# Patient Record
Sex: FEMALE | Race: WHITE | NOT HISPANIC OR LATINO | Employment: FULL TIME | URBAN - METROPOLITAN AREA
[De-identification: names, ages, dates, MRNs, and addresses within clinical notes are randomized per-mention and may not be internally consistent; named-entity substitution may affect disease eponyms.]

---

## 2021-02-04 ENCOUNTER — TRANSFERRED RECORDS (OUTPATIENT)
Dept: HEALTH INFORMATION MANAGEMENT | Facility: CLINIC | Age: 26
End: 2021-02-04

## 2021-02-19 ENCOUNTER — TRANSFERRED RECORDS (OUTPATIENT)
Dept: HEALTH INFORMATION MANAGEMENT | Facility: CLINIC | Age: 26
End: 2021-02-19

## 2021-02-24 ENCOUNTER — TRANSFERRED RECORDS (OUTPATIENT)
Dept: HEALTH INFORMATION MANAGEMENT | Facility: CLINIC | Age: 26
End: 2021-02-24

## 2021-05-11 ENCOUNTER — MEDICAL CORRESPONDENCE (OUTPATIENT)
Dept: HEALTH INFORMATION MANAGEMENT | Facility: CLINIC | Age: 26
End: 2021-05-11

## 2021-05-28 ENCOUNTER — TRANSFERRED RECORDS (OUTPATIENT)
Dept: HEALTH INFORMATION MANAGEMENT | Facility: CLINIC | Age: 26
End: 2021-05-28

## 2021-06-16 ENCOUNTER — TRANSFERRED RECORDS (OUTPATIENT)
Dept: HEALTH INFORMATION MANAGEMENT | Facility: CLINIC | Age: 26
End: 2021-06-16

## 2021-09-27 ENCOUNTER — TRANSFERRED RECORDS (OUTPATIENT)
Dept: HEALTH INFORMATION MANAGEMENT | Facility: CLINIC | Age: 26
End: 2021-09-27

## 2022-03-05 ENCOUNTER — TRANSFERRED RECORDS (OUTPATIENT)
Dept: HEALTH INFORMATION MANAGEMENT | Facility: CLINIC | Age: 27
End: 2022-03-05

## 2023-03-07 ENCOUNTER — TRANSFERRED RECORDS (OUTPATIENT)
Dept: HEALTH INFORMATION MANAGEMENT | Facility: CLINIC | Age: 28
End: 2023-03-07

## 2023-03-08 ENCOUNTER — TRANSFERRED RECORDS (OUTPATIENT)
Dept: HEALTH INFORMATION MANAGEMENT | Facility: CLINIC | Age: 28
End: 2023-03-08

## 2023-08-03 ENCOUNTER — TELEPHONE (OUTPATIENT)
Dept: AUDIOLOGY | Facility: CLINIC | Age: 28
End: 2023-08-03
Payer: MEDICAID

## 2023-08-03 NOTE — TELEPHONE ENCOUNTER
3rd call  Was able to verify with financial (Hua Brumfield) that [facility] is accepted form of coverage. Spoke to Nancy to add this to account. Will verify with audiology that these appointments are ok for next week.    -Huong NO 8/8/23 5:35pm     -----2nd call------  CI Coordinator called Nancy again - reviewed some info. Pt states [facility] and [facility]. Wants to know if [facility] is covered at our [facility]. Writer will inquire with financial team. Nancy states [facility] told her CI surgery would need to be in [place] and she doesn't want to travel there, would prefer [facility] if possible. Also inquired if she could have CI surgery bilaterally at same time. Writer reviewed CI process and need for CI evaluation to determine if a candidate, as well as surgeon visit for medical input/decisions.  Some CI Intake completed *see below*. Nancy could not talk further due to being at work, but will call CI provider back with previous audiology info.    Huong NO 8/8/23  ________________________________________  -----1st call------  Writer called Nancy to reach out for CI Intake questions/to make sure CI evaluation was appropriately scheduled and gather hearing history for audiologist. Left voice message with direct contact # for calback.    -Huong NO   CI Coordinator   8/3/23   ________________________________________    ------------------------------  CI Eval Intake (Complete)    Referring Provider and clinic:   Self-referred, per scheduling note    Have you seen any other Audiologist or ENT provider:  [facility] - [address] [place]  [address]  -extensive hearing test, might have both ENT & Audiology    Do you wear hearing aids: Hasn't worn in a year feels they don't help - hearing decreased, wore HA's since [age], worse hearing ear is right ear, sudden loss around [id number]. Started with pressure behind ear, ringing in ear, said providers did genetic testing and  she has a genetic hearing loss (but she doesn't know what type)    Have you ever had ear surgery: No    Have you had any imaging done of your head: Yes - [facility] ~ [id number]     Have you had an Audiogram done in the last 6 months: No, scheduled prior to CIE    What is the best way to contact you- Phone, Mychart or email? Phone- leave voicemail as she often works late or email    [email or web address]    Do you have MyChart set up? - May set up if coverage works for appts    Is there someone we can contact on your behalf for communications assistance?:     If we need you to sign a release of information to obtain your records would you prefer that be mailed to you or emailed?   [email or web address]

## 2023-08-09 NOTE — TELEPHONE ENCOUNTER
FUTURE VISIT INFORMATION      FUTURE VISIT INFORMATION:  Date: 8/16/23  Time: 1:00pm  [place] [facility]  REFERRAL INFORMATION:  Reason for visit/diagnosis  CIE    RECORDS REQUESTED FROM:       Clinic name Comments Records Status Imaging Status    [facility]  Request for recs sent 8/9  Fax: [phone]     [facility]  Ov/audio done 11/8/22- Request for graph sent 8/9

## 2023-08-09 NOTE — TELEPHONE ENCOUNTER
See outside encounter from 11/8/2022 [facility] w. LEN Choudhury  for additional info regarding hearing loss, prior CI eval, etc.    [facility] recs scanned in Media tab on 8/10/23.

## 2023-08-16 ENCOUNTER — PRE VISIT (OUTPATIENT)
Dept: AUDIOLOGY | Facility: CLINIC | Age: 28
End: 2023-08-16

## 2023-08-16 ENCOUNTER — OFFICE VISIT (OUTPATIENT)
Dept: AUDIOLOGY | Facility: CLINIC | Age: 28
End: 2023-08-16
Payer: MEDICAID

## 2023-08-16 DIAGNOSIS — H90.3 ASYMMETRICAL SENSORINEURAL HEARING LOSS: Primary | ICD-10-CM

## 2023-08-16 DIAGNOSIS — H90.3 SENSORINEURAL HEARING LOSS, BILATERAL: Primary | ICD-10-CM

## 2023-08-16 DIAGNOSIS — H90.5 SNHL (SENSORINEURAL HEARING LOSS): Primary | ICD-10-CM

## 2023-08-16 PROCEDURE — 92557 COMPREHENSIVE HEARING TEST: CPT | Mod: 52 | Performed by: AUDIOLOGIST

## 2023-08-16 PROCEDURE — 92567 TYMPANOMETRY: CPT | Performed by: AUDIOLOGIST

## 2023-08-16 PROCEDURE — 92626 EVAL AUD FUNCJ 1ST HOUR: CPT | Performed by: AUDIOLOGIST

## 2023-08-16 NOTE — PROGRESS NOTES
AUDIOLOGY REPORT: Adult Cochlear Implant Evaluation  SUBJECTIVE: Nancy Francisco, 28 year old female was seen in the Audiology Clinic at [facility] on 8/16/2023 to assess audiologic candidacy for a cochlear implant. Nancy was accompanied by her daughter. 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      Nancy reports that she was using hearing aids since age 18. She gave birth to her son in 2020 and she feels her hearing loss began to progress around that time. In 2020, Nancy began having pressure behind her right eye and very loud tinnitus. She then completely lost the hearing in her right ear. Nancy reports she had imaging of her head after this incident and it was clear. Nancy was reportedly fit with a BiCROS at the end of 2020 at [facility] in [place]. She didn't feel that she could hear clearly with the BiCROS so she donated it to the [employer] Nancy reports that when she tried using the left hearing aid only she still had difficulty hearing in groups and in noisy settings. She reports that she worked with an audiologist at [facility] in [place] and her last hearing test was done at [facility] in [place]. She currently doesn't use any assistive devices. Nancy has an extensive family history of hearing loss, including her mother, uncle, grandfather, older sister, and daughter. Nancy works as a . She is supposed to wear hearing protection as part of her PPE; however, she often works without hearing protection for safety reasons. Nancy typically works four 11 hour shifts per week. She reports that she plans to try using some impulse noise reduction ear plugs to see if those will help.     Onset of hearing loss: 18 years old (possibly earlier in childhood but not identified)  Identification of hearing loss: 18 years old  Etiology of hearing loss: Unknown-  significant family history of hearing loss  Sudden or Progressive: Progressive  Age Hearing Aid fit: 18-19 years old  Duration of Hearing Aid use: not currently using hearing aids  Current Hearing Aid Type: Not currently using hearing aids  Age of current Hearing Aid: Not currently using hearing aids  Tinnitus: Present right (intermittent ring)  Balance: No concerns  Other:  none  Does patient exhibit intelligible vocalizations?  Yes  Primary Mode of Communication: Oral/aural/lipreading   Previous Ear Surgeries: Keloid removed from one earlobe in 2019  Previous Other Surgeries:  in  and in       OBJECTIVE:  To evaluate candidacy for cochlear implant. Candidacy requires at least a moderate to profound sensorineural hearing loss in at least one ear, good general health; lack of benefit from conventional amplification as determined from the tests below, psychological/emotional stability and motivationally suitable, with realistic expectations, and absence of medical conditions contraindicating surgical intervention.     The Cochlear Implant Quality of Life-10 Global measure is a patient-reported outcome measure  developed to provide an overall assessment of quality of life before and after cochlear implantation by answering 10 questions related to hearing, listening and communication in daily life.  A higher score is less perceived difficulty.   Pre operative score/Post operative score is 14 out of a total possible score of 50.     Tinnitus Handicap Inventory: Pre operative score is 10 out of a total possible score of 100.    Otoscopy revealed ears are clear of cerumen bilaterally.     Tympanogram:  RIGHT: normal eardrum mobility  LEFT: positive pressure (type C)    Reflexes (reported by stimulus ear): could not test due to equipment limitations.    Distortion product otoacoustic emissions (DPOAEs) were not performed today.     Pure tone thresholds were assessed using conventional techniques with good  reliability from 250-8000 Hz using insert earphones and circumaural headphones.  RIGHT: profound sensorineural hearing loss (unmeasureable hearing at all frequencies)  LEFT: mild sloping to severe rising to moderate sensorineural hearing loss     Speech Reception Threshold:  RIGHT: CNT due to dead ear  LEFT:   45 dB HL    Word Recognition Score:   RIGHT: CNT due to dead ear  LEFT:   100% at 85 dB HL using recorded NU-6 word list.    Device(s) used for Aided Testing:   Right ear: Clinic loaner Phonak Rain P90-UP  Left ear: Clinic loaner Phonak Rain P90-UP    Electroacoustic Test Results: Nancy didn't bring hearing aids to the appointment today since she isn't currently using hearing aids. Loaner Phonak Rain P90-UP hearing aids were programmed to meet NAL-NL2 prescriptive targets when assessed using simulated real-ear measurements. These hearing aids were used for all testing. The right hearing aid fell below gain targets above 2000 Hz so frequency lowering was enabled. The hearing aid volume had to be decreased by 2 clicks on the volume control to make the sound more tolerable for Nancy during testing. She reports that using the hearing aid gave her a headache.    45 minutes were spent assessing the patient's auditory rehabilitation status in order to determine whether conventional amplification is beneficial or whether the patient is an audiologic candidate for a cochlear implant.      Soundfield Thresholds (see audiogram):   Left aided: Detection in the mild sloping to severe rising to mild hearing loss range  Right aided: Detection in the un-measureable hearing loss range    CNC Words Test: The patient repeats 50 single syllable words, auditory only. The words are presented at 60 dB A (conversational level) through a recording.     Left ear aided: 56% words  Right ear aided: 0% words  Bilaterally aided: 48% words    [employer]Bio Sentences Test: The patient repeats 20 sentences, auditory only.  The sentences are  presented at 60 dB A (conversational level) delivered through a recording.       Left ear aided: 86% in quiet, 55% with +10 dB signal to noise ratio (SNR), 23% with +5 dB SNR  Right ear aided: 0% in quiet  Bilaterally aided: 75% in quiet, 40% with +10 dB SNR      ASSESSMENT: Testing today reveals unmeasureable hearing in the right ear and mild sloping to severe rising to moderate sensorineural hearing loss in the left ear with limited benefit from conventional amplification.     Ear recommended for implantation: right. Patient is meeting FDA/Medicare candidacy criteria and is not receiving adequate speech understanding benefit from hearing aids.      We discussed FDA and Medicare candidacy guidelines and that Nancy is meeting criteria. Other items discussed included:    -Cochlear implant systems including the internal and external components; how cochlear implants work and function differently than hearing aids and the differences between acoustic and electric hearing.    -The cochlear implantation process including pre-surgical visits and extensive follow-up programming appointments.     -Realistic expectations. Relearning to hear will be a slow process as sound is different from acoustic hearing; time, practice, and programming is needed to optimize sound quality and speech understanding; hearing may never be ideal or as expected; and phone use, music appreciation; and understanding in background noise may be limited.    -Risk factors:   Internal device failure; damage to the vestibular system and/or facial nerve; infection; possible loss of residual hearing on the implanted ear (although that should not affect performance outcomes); and all other risks reviewed on the Pre-Cochlear Implant Counseling handout.     Through patient interview during the consultation process this patient demonstrated the cognitive ability to use auditory clues and a willingness to undergo an extended program of rehabilitation.    PLAN:  Nancy has been diagnosed with mild sloping to severe rising to moderate sensorineural hearing loss in the left ear and a dead ear in the right ear and is a candidate for cochlear implantation in the right ear. It is recommended that they return to complete the remainder of the team evaluation; CT scan, surgeon visit, health psychology and device selection. Please contact the clinic at [phone] with questions regarding these results or recommendations.    Juan Pablo Roblero, [facility]  [place] Licensed Audiologist #[id number]

## 2023-08-16 NOTE — PROGRESS NOTES
AUDIOLOGY REPORT    SUBJECTIVE:  Nancy Francisco is a 28 year old female who was seen in the Audiology Clinic at the [facility] for audiologic evaluation. Patient reports hearing loss in both ears since she was a teenager. She began wearing binaural hearing aids at age 19. In 2020,  she began having pressure behind her right eye and very loud tinnitus. She then completely lost the hearing in her right ear. She reports having imaging of her head after this incident and it was clear. She reports she had a BiCROS hearing aid system, but stopped wearing it  as she did not like it. She has intermittent tinnitus in her right ear, none in the left. She has noise exposure as a  for the past year and does not wear ear plugs. She states she cannot wear ear plugs at her job due to safety concerns. She has an extensive family  history of hearing loss, including her mother, uncle, grandfather, older sister, and daughter. She denies otalgia, otorrhea, aural fullness, dizziness, and past ear surgery.    OBJECTIVE:    Otoscopic exam indicates ears are clear of cerumen bilaterally     Pure Tone Thresholds assessed using conventional audiometry with good  reliability from 250-8000 Hz bilaterally using insert earphones and circumaural headphones     RIGHT:  complete hearing loss    LEFT:  moderate to severe to moderate sensorineural hearing loss    Tympanogram:    RIGHT: normal eardrum mobility    LEFT:   positive pressure    Reflexes (reported by stimulus ear):   Could not test due to equipment limitations    Speech Reception Threshold:    RIGHT: Could not test due to severity of hearing loss    LEFT:   45 dB HL  Word Recognition Score:     RIGHT: Could not test due to severity of hearing loss    LEFT:   100% at 85 dB HL using NU-6 recorded word list.      ASSESSMENT:   Pure tone audiometry showed a complete hearing loss of the right ear and moderate to severe to moderate sensorineural hearing loss in  the left ear. Today s results were discussed with the patient in detail.     PLAN:   It is recommended that the patient follow up with Dr. Moira Osuna for a cochlear implant evaluation today as scheduled. Please call this clinic with questions regarding these results or recommendations.      Juan Pablo Casas, CCC-A  [place] Licensed Audiologist #[id number]

## 2023-08-24 ENCOUNTER — TELEPHONE (OUTPATIENT)
Dept: OTOLARYNGOLOGY | Facility: CLINIC | Age: 28
End: 2023-08-24
Payer: MEDICAID

## 2023-08-24 NOTE — TELEPHONE ENCOUNTER
Records Requested August 24, 2023 6:31 AM  [id number]   Facility  [facility]  [address] [place] [address]  Tel: [phone]  Fax: [phone]   Outcome Faxed request for recs.    August 25, 2023 at 12:10 PM - Called and spoke to the patient. Patient said she did have some kind of genetic testing done in [place]. All her records are [place] in [place]. Patient okay to signed SUREKHA and email to [email or web address] -Kaitlyn    September 7, 2023 at 9:02 AM - Received signed SUREKHA and send to scan. Reached out to facility on previous request and spoke to Kaitlyn who states that fax was received and will work on it shortly -Kaitlyn    September 12, 2023 at 9:30 AM - Received recs and send to scanning. Email recs to Dr Holley and team. Reached out to facility look like missing imaging report. Spoke to Clay and she will send it shortly -Kaitlyn    September 12, 2023 at 10:23 AM - Received from [facility] that patient had 2021 MR imaging done at [facility], fax # is [phone]. Request for imaging report. -Kaitlyn     Records Requested September 18, 2023 11:31 AM  [id number]   Facility  [facility]  [address] [place] [id number]   Outcome September 14, 2023 at 9:36 AM - Called [facility] medical records and spoke to Nina who transfer me to Agrivi at [phone], unable to speak to a live person as it went to  and request faxed request. Faxed a request to [phone] for disc and report -Kaitlyn    September 18, 2023 at 11:30 AM - Called [facility] film room at [phone] and they did not received request. Resend request and fedex label, [id number] -Kaitlyn

## 2023-09-05 DIAGNOSIS — H90.5 SNHL (SENSORINEURAL HEARING LOSS): Primary | ICD-10-CM

## 2023-09-21 NOTE — TELEPHONE ENCOUNTER
Imaging DISC Received  September 21, 2023 3:26 PM    Action: Imaging disc from [facility] received and taken to Cora at [facility] for upload.

## 2023-09-29 ENCOUNTER — TELEPHONE (OUTPATIENT)
Dept: OTOLARYNGOLOGY | Facility: CLINIC | Age: 28
End: 2023-09-29
Payer: MEDICAID

## 2023-09-29 NOTE — TELEPHONE ENCOUNTER
Spoke to Nancy to schedule CT & surgeon visit. Explained she is an audiological candidate, but needs this visit with CI surgeon to discuss medical candidacy. She understood. Will mail visit reminder per request. Noted I'm working with Gianluca Roblero to find a time for CI device selection appt if proceeding with Ci and will notify her as able. No further questions.    -Huong NO 9/29/23

## 2023-10-02 NOTE — TELEPHONE ENCOUNTER
FUTURE VISIT INFORMATION:      FUTURE VISIT INFORMATION:  Date: 10/13/2023  Time: 10 AM  Location: [facility]  REFERRAL INFORMATION:  Referring provider:   Referring providers clinic:   Reason for visit/diagnosis:  NCO, CT prior 9:40am     RECORDS REQUESTED FROM:       Clinic name Comments Records Status Imaging Status   [facility]  2021 note Richy Case MD Scanned in [facility]  2021 note Amado Quiñonez MD    2[facility]19/21 [facility]   [facility]  [place], [place] 5/11/21 note -Kash Cuello, Gianluca  9/27/21 audiogram  8/21/21 audiogram Scanned in [facility] 3/8/23 note Sherron Graves  3/7/23 audiogram Scanned in [facility] [place] 8/16/23 note- Venus Osuna, Gianluca and Jennifer Thomas AuD    8/16/23 audiogram [facility]

## 2023-10-13 ENCOUNTER — ANCILLARY PROCEDURE (OUTPATIENT)
Dept: CT IMAGING | Facility: CLINIC | Age: 28
End: 2023-10-13
Attending: OTOLARYNGOLOGY
Payer: MEDICAID

## 2023-10-13 ENCOUNTER — PREP FOR PROCEDURE (OUTPATIENT)
Dept: OTOLARYNGOLOGY | Facility: CLINIC | Age: 28
End: 2023-10-13

## 2023-10-13 ENCOUNTER — OFFICE VISIT (OUTPATIENT)
Dept: OTOLARYNGOLOGY | Facility: CLINIC | Age: 28
End: 2023-10-13
Payer: MEDICAID

## 2023-10-13 ENCOUNTER — PRE VISIT (OUTPATIENT)
Dept: OTOLARYNGOLOGY | Facility: CLINIC | Age: 28
End: 2023-10-13

## 2023-10-13 VITALS
HEIGHT: 64 IN | BODY MASS INDEX: 37.05 KG/M2 | WEIGHT: 217 LBS | SYSTOLIC BLOOD PRESSURE: 108 MMHG | DIASTOLIC BLOOD PRESSURE: 66 MMHG | HEART RATE: 63 BPM | OXYGEN SATURATION: 97 % | TEMPERATURE: 98.8 F

## 2023-10-13 DIAGNOSIS — H90.5 SNHL (SENSORINEURAL HEARING LOSS): ICD-10-CM

## 2023-10-13 DIAGNOSIS — H91.20 SUDDEN-ONSET SENSORINEURAL HEARING LOSS: ICD-10-CM

## 2023-10-13 DIAGNOSIS — H90.5 SNHL (SENSORINEURAL HEARING LOSS): Primary | ICD-10-CM

## 2023-10-13 DIAGNOSIS — H90.3 SENSORINEURAL HEARING LOSS (SNHL) OF BOTH EARS: Primary | ICD-10-CM

## 2023-10-13 PROCEDURE — 70480 CT ORBIT/EAR/FOSSA W/O DYE: CPT | Mod: GC | Performed by: STUDENT IN AN ORGANIZED HEALTH CARE EDUCATION/TRAINING PROGRAM

## 2023-10-13 PROCEDURE — 99204 OFFICE O/P NEW MOD 45 MIN: CPT | Performed by: OTOLARYNGOLOGY

## 2023-10-13 RX ORDER — LAMOTRIGINE 100 MG/1
100 TABLET ORAL DAILY
COMMUNITY
Start: 2022-06-16

## 2023-10-13 RX ORDER — PROPRANOLOL HYDROCHLORIDE 10 MG/1
10 TABLET ORAL
COMMUNITY
Start: 2022-06-16

## 2023-10-13 RX ORDER — CEFUROXIME SODIUM 1.5 G/16ML
1.5 INJECTION, POWDER, FOR SOLUTION INTRAVENOUS SEE ADMIN INSTRUCTIONS
Status: CANCELLED | OUTPATIENT
Start: 2023-10-13

## 2023-10-13 RX ORDER — CEFUROXIME SODIUM 1.5 G/16ML
1.5 INJECTION, POWDER, FOR SOLUTION INTRAVENOUS
Status: CANCELLED | OUTPATIENT
Start: 2023-10-13

## 2023-10-13 RX ORDER — SERTRALINE HYDROCHLORIDE 100 MG/1
100 TABLET, FILM COATED ORAL DAILY
COMMUNITY
Start: 2022-06-16

## 2023-10-13 RX ORDER — DEXAMETHASONE SODIUM PHOSPHATE 4 MG/ML
10 INJECTION, SOLUTION INTRA-ARTICULAR; INTRALESIONAL; INTRAMUSCULAR; INTRAVENOUS; SOFT TISSUE ONCE
Status: CANCELLED | OUTPATIENT
Start: 2023-10-13 | End: 2023-10-13

## 2023-10-13 RX ORDER — DEXMETHYLPHENIDATE HYDROCHLORIDE 10 MG/1
10 TABLET ORAL 2 TIMES DAILY
COMMUNITY

## 2023-10-13 ASSESSMENT — PAIN SCALES - GENERAL: PAINLEVEL: NO PAIN (0)

## 2023-10-13 NOTE — PROGRESS NOTES
Neurotology Clinic      Name: Nancy Francisco  MRN: [id number]  Age: 28 year old  : 1995  10/13/23     Chief Complaint:   Consultation    History of Present Illness:   Nancy Francisco is a 28 year old female with a history of bilateral sensorineural hearing loss who presents for consultation regarding cochlear implantation.     Patient reports that she has had hearing loss in both ears since she was a teenager. She began wearing binaural hearing aids at age 18.  She gave birth to her son in  and feels that her hearing loss began to progress around that time.  In , she began having pressure behind her right eye and very loud tinnitus. She then completely lost the hearing in her right ear suddenly. She reports having imaging of her head after this incident. She reports she had a BiCROS hearing aid system at [facility] in [place], but stopped wearing it as she felt like she could not hear clearly with it.    She has not had any vertigo or hearing loss. Her mom has vertigo. While others in her family have hearing loss, no one has had the sudden change that she has had. She only has tinnitus in the right side and it comes on when she moves her head back and forth or up and down. But it is not particularly bothersome to her.    Notes from [facility] Dr. Bianchi state that she had an MRI done in  which was negative.  He also obtain genetic testing and stated that she was heterozygous for 5 mutations but they had uncertain significance for hearing loss.  They discussed cochlear implantation at that time.  [place] would not cover the CI based on her hearing testing results. She has moved to [place], [place] the [place] border.  She contacted [place] and they indicated they do cover it for her indication.    She has intermittent tinnitus in her right ear, none in the left. She has noise exposure as a  for the past year and does not wear ear plugs. She states she cannot wear  ear plugs at her job due to safety concerns.     She has an extensive family history of hearing loss, including her mother, uncle, grandfather, older sister, and daughter. She denies otalgia, otorrhea, aural fullness, dizziness, and past ear surgery.    She previously had a keloid removed from her earlobe in 2019.  She had a  in  and in .       Review of Systems:   Pertinent items are noted in HPI or as in patient entered ROS below, remainder of complete ROS is negative.       10/13/2023    10:07 AM    ENT ROS   Constitutional Problems with sleep   Ears, Nose, Throat Nasal congestion or drainage   Allergy/Immunology Rash   Other Rash        Active Medications:     Current Outpatient Medications:     dexmethylphenidate (FOCALIN) 10 MG tablet, Take 10 mg by mouth 2 times daily, Disp: , Rfl:     lamoTRIgine (LAMICTAL) 100 MG tablet, Take 100 mg by mouth daily, Disp: , Rfl:     propranolol (INDERAL) 10 MG tablet, Take 10 mg by mouth, Disp: , Rfl:     sertraline (ZOLOFT) 100 MG tablet, Take 100 mg by mouth daily, Disp: , Rfl:     sertraline (ZOLOFT) 50 MG tablet, Add this tablet together with the 100mg dose to equal 150mg daily dose, Disp: , Rfl:    Dexmethylphenidate  EpiPen as needed  Motrin  Lamotrigine  Sertraline  Propranolol    Had nausea and vomiting with prior anesthesia.    Allergies:   Tree nuts; has had anaphylaxis but has not needed epi pen because was already at hospital when it happened.  Latex - she gets hives or skin redness if placed on her skin; hand swelling when using latex paint; worst experience at dentist she got hives around her mouth; problem with bandaids and blowing up balloons.    Past Medical History:  Attention deficit hyperactivity disorder  Generalized anxiety disorder  Major depressive disorder  Sensorineural hearing loss    Had some hypertrophic scarring around ear gauge site and needed steroid shots; this did not happen at her  site or at site of lip  "piercings or other sites all done since then.     Past Surgical History:  2 c-sections  1 d&c    Family History:   Hearing loss     Social History:   Social History     Tobacco Use    Smoking status: Every Day     Types: Cigarettes    Smokeless tobacco: Never    Tobacco comments:     Uses vape    She uses tobacco products to help her stay awake during night shift; she vapes nicotine.     Physical Exam:   /66   Pulse 63   Temp 98.8  F (37.1  C)   Ht 1.626 m (5' 4\")   Wt 98.4 kg (217 lb)   SpO2 97%   BMI 37.25 kg/m     Constitutional:  The patient was unaccompanied, well-groomed, and in no acute distress.     Skin: Normal:  warm and pink without rash   Neurologic: Alert and oriented x 3.  CN's III-XII within normal limits.  Voice normal.    Psychiatric: The patient's affect was calm, cooperative, and appropriate.     Communication:  Normal; communicates verbally, normal voice quality.   Respiratory: Breathing comfortably without stridor or exertion of accessory muscles.    Eyes: Pupils were equal and reactive.  Extraocular movement intact.     Ears: Pinnae and tragus non-tender.  EAC's and TM's were clear with normal tympanic membranes     Audiogram:  AUDIOGRAM: She underwent an audiogram in August 2023. This demonstrated:    Audio: Right: profound SNHL (complete loss) vibrotactile responses to bone conduction  Left: mild to moderately severe sensorineural hearing loss     Right: Speech reception threshold is CNT dB with CNT% word recognition. Tympanogram A type   Left: Speech reception threshold is 45 dB with 100% word recognition. Tympanogram C type     Audiogram was independently reviewed    Cochlear implant evaluation was performed by Venus Hough in [place].  The results were independently reviewed.    Aided thresholds - Left detection in mild sloping to severe rising to mild hearing loss range. Right detection in the un-measureable  hearing loss range.    CNC words: Left 56%, Right 0%, Bilateral " 48%.   [employer] sentences in quiet: Left 86%, Right 0%, Bilateral 75%.   [employer] sentences in noise:  Left 55% at +10 dB SNR, 23% at +5 dB SNR. Bilateral at +10 dB SNR.    Imaging: Temporal bone CT scan from today and MRI from 2021 were independently reviewed.     I reviewed temporal bone CT scan and the cochlea has the normal number of turns on the right side and there is an aerated mastoid and appropriately sized facial recess that is amenable for cochlear implant.    I reviewed the MRI from 2021.  This was performed without contrast.  There are fine sections with T2.  When I look at the cochlea on each side, the left cochlea has appropriate T2 signal intensity throughout the basal turn and alternans of the cochlea.  On the right side however there appears to be a lack of T2 signal intensity within the basal turn where the cochlear implant would need to be inserted.  1 questions if this means that there is a filling defect in the arturo tympani although there may be some fluid in the skin vestibular.  Its been 2 years and I would be inclined to have this repeated to determine if there is scar tissue within the basal turn.    Outside records from [facility] were independently reviewed.    Outside imaging was reviewed.     Assessment and Plan:  Nancy Francisco is a 28 year old female with progressive bilateral sensorineural hearing loss punctuated by a right sudden hearing loss 2-1/2 years ago.  She is no longer benefiting sufficiently from hearing aids and has poor word understanding even with well fitted hearing aids in place.  The right ear is considered for cochlear implantation.    We discussed her test results in detail and that audiometrically her right ear is an excellent candidate for cochlear implantation.  Given the degree of difficulty understanding words in the left ear, the right ear would provide input that could improve her communication ability.  We discussed the range of potential outcomes and  benefits from cochlear implantation as well as some of the limitations.    Regarding the evaluation so far, I did discuss with her that her CT anatomy appears amenable for cochlear implantation.  One concern I have is that the previous MRI that was performed without gadolinium enhancement shows a possible filling defect within the basal turn of the right cochlea.  We need to obtain a gadolinium enhanced MRI with high-resolution views of the cochlea as well to assess for any cochlear obstruction or the possibility of a schwannoma.  Our hope is that this proves to be an artifact of the way that the MRI was obtained in the past but obstruction of the basal turn would have implications for the device placement, procedure, and device selected.    Given that we may need an electrode with a stylette, I recommended that she have a [employer] implant and she has been inclined towards this as she has been interested in their Bluetooth connectivity is well as the potential of having a button processor.  She does have a thick scalp on her CT and I do have some concern that she would not be able to retain a button processor and thus would need to have a behind-the-ear model.  We would have a 612 and 622 available.    We talked about what cochlear implant surgery entails in detail, each step, as well as the common and serious risks.  The risks and benefits of the procedure were discussed.  The risks include but are not limited to:  Expected loss of residual hearing, worsened tinnitus which may improve with activation of the device, dizziness, damage to the taste nerve, damage to the facial nerve, CSF leak, infection with need for explanation and reimplantation, device failure, and possible need for further surgery.  It was also discussed that all implant recipients are at greater risk for meningitis and the need for pneumococcal vaccination.  The patient understood the discussion and had a chance to have her questions  answered.  We will begin the scheduling process.      Lazraa Holley MD  Otology & Neurotology  [facility]

## 2023-10-13 NOTE — PATIENT INSTRUCTIONS
You were seen in the ENT Clinic today by Dr. Holley. If you have any questions or concerns after your appointment, please contact us (see below)         Cochlear Implant Pre-Operative and Post-Operative Instructions:     Our surgery schedulers, will contact you with a potential date for surgery after obtaining prior authorization for cochlear implant surgery. The Prior Authorization Process can take 4-6 weeks, and schedulers are unable to call you until it is finalized through this process.        PRE-PROCEDURE HIGHLIGHTS: COCHLEAR IMPLANT     1.PNEUMOCOCCAL VACCINATIONS - summary below:  Meningitis is an infection of the fluid that surrounds the brain and spinal cord. There are two main types of meningitis, viral and bacterial. Bacterial meningitis is the more serious type and the type that has been reported in individuals with cochlear implants. The symptoms, treatment, and outcomes may differ depending on the cause of the meningitis. Meningitis in individuals with cochlear implants is most commonly caused by the bacterium Streptococcus pneumoniae (pneumococcus). Pneumococcal vaccination protects against Streptococcus pneumoniae (commonly referred to as pneumococcus), the bacterium most commonly responsible for meningitis after cochlear implant surgery. Although extremely rare, meningitis has been reported worldwide in patients with cochlear implants, and can be immensely serious. To minimize the risk of infection, we require all cochlear implant patients to receive age-appropriate pneumococcal vaccination and to provide us with proof of vaccination. We are taking these precautions in accordance with guidelines established by the Food and Drug Administration (FDA), Center for Disease Control (CDC), and state health departments.     When should I get the vaccines?  Your age and shot history affect which vaccine you should get and when. Talk to your provider. The vaccine guidelines that we follow are from the Centers  for Disease Control (CDC).      Vaccine guidelines for adults getting a cochlear implant:  If you have never had a pneumococcal vaccine, you should get the PCV20 (Nltxlxo61) or PCV15 (Vaxneuvance).   If you get the PCV20, you do not need any other vaccines.   If you get the PCV15, you will need a second vaccine (the Pneumovax 23) at least 8 weeks later.   If you have previously received a dose of the Pneumovax 23:   You should get 1 dose of the PCV20 (Dimrbik30) or PCV15 (Vaxneuvance) at least 1 year after receiving the Pneumovax 23.   If you have previously received a dose of the Boemrzo55:   You should get 1 dose of the Usuegpmnt94 at least 8 weeks after receiving the Ksnjoqw50.   If you have previously received both the Vjgyjijyk40 and Jwxiepr09:   You do not need any other vaccines. Together, Xzzjfzz08 and Kjhfnszum85 meet the meningitis vaccine guidelines for cochlear implant     **Finishing the vaccination process is important, but may need to be finished after surgery due to the recommended vaccine schedule- may need to be one year apart (see vaccine handout above).     To learn more:  Talk to your provider. You can also visit these CDC websites:  https://www.cdc.gov/vaccines/vpd/pneumo/public/index.html  https://www.cdc.gov/vaccines/vpd/pneumo/hcp/kzj-royg-ea-vaccinate.html  http://www.cdc.gov/vaccines/vpd-vac/mening/cochlear/rrj-cctqqwvn-mjq-hcp.htm         **Please have your clinic fax us the proof of immunizations  [phone].Cochlear implant candidates must have completed at least part of the immunization requirements prior to surgery. Full compliance with the full immunization schedule must be documented as soon as possible.        2. HISTORY AND PHYSICAL: You must have a physical exam (called  history and physical ) within 30 days of surgery. You can do this at the Nurse Practitioner Clinic here at the [facility] or your family clinic. Your doctor will let you know if you need to have this physical  completed by our PAC (Preoperative Assessment Center) team at the [facility]. Bring the paper copy of the Pre-Op Surgery Form with you to your primary doctor if they are outside of the [facility] system, as this paper form includes our fax number.     3. NO MOTRIN, IBUPROFEN, ASPIRIN, ALEVE, GARLIC SUPPLEMENTS or FISH OIL x 7 days prior to surgery ( to prevent excess bleeding and bruising at time of surgery)     4. Review written material in Cochlear implant surgery teaching packet and always feel free contact us for further questions.      5. Audiology appointments as recommended before surgery (example: device selection appointment)     6. Pre-op Shower: Take a bath or shower the night before and the morning of surgery (refer to surgery packet for extra information). You should use the soap that we mailed to you or gave in clinic (2 small bottles). Use the entire bottle of soap for each shower, washing from the neck down, then rinsing off. Sleep in clean clothing and use clean bedding sheets. If your doctor does not give you special soap, buy Hibiclens or Lisa-Stat at the drug store or ask the pharmacist to suggest a brand. Do not put on lotion, powder, perfume, deodorant or make-up after bathing.        What else should I know about the surgery?  Any hearing you had in the implant ear may be gone after surgery.  You will depend upon a cochlear implant for the rest of your life  If the implant is removed, you will not regain the hearing you had before surgery.  New bone could grow and push your cochlear implant out of place. If this happens, you will need another surgery.  The skin at the site could get infected or wear away. If this happens, we may have to remove the implant. It may be possible to re-implant the device.  The device may stop working. If this happens, we may be able to replace it. We do not yet know the average lifetime of cochlear implants.  If you need a medical procedure done the head or neck, talk to  your surgeon first.   If you need an MRI, you must first see your surgeon. If the implant is exposed to an MRI device, it can harm the implant or the person. We suggest you get a medical ID bracelet stating you have a cochlear implant.      We may not do the implant if we find:   A leak from your cerebrospinal fluid (fluid from the spinal cord and brain)  The cochlea is filled in with bone.  It is too hard to reach the cochlea.         POST SURGERY CARE/HIGHLIGHTS: COCHLEAR IMPLANT      1.   What to expect after surgery:  Nausea: Some people feel very sick and others do not. If it is severe, you may stay overnight.  Dizziness: This may be mild. If it is severe, you may stay overnight.  Tasting blood or coughing up a small amount of blood: This is normal.  Sore throat: Your throat may be scratchy from the breathing tube used during surgery.  Sore neck: Your neck may be sore because, during surgery, your head was turned to one side for an extended period of time.  Metal taste in the mouth: This may happen if the taste nerve was injured during surgery. The bad taste may last up to a couple of months.  Roaring in the ears or tinnitus: This is common and may go away with time.  Mild or generalized swelling: This will go down slowly over 2 months.  Numbness: Your ear will be numb. Gradually, feeling will return. Sometimes the very top of the ear remains numb.     **Most of the effects of surgery should go away within one to two weeks. Some effects could be permanent.        2.  Pain/Medication:  If you have pain, you may take Tylenol. Your doctor may also prescribe pain medicine. This medicine may cause constipation.  You should have a bowel movement within three days of surgery.  If not, ask your pharmacist about an over the counter stool softener.        3.   Wound care:    You can remove your head dressing in 2 days.    Leave the steri-strips (band aids) in place- they will fall off on their own in 10-12 days- you may  trim the edges as they loosen.    Dry Ear Precautions - prior to shower: gently place cotton ball into ear canal, cover external portion with Vaseline to form a seal and repel water. Remove cotton ball afterward.  Do not shower until 48 hours after surgery. It is best to shower with a shower cap in place and then use a cup to cover the ear to wash hair over the sink. Do not let your incision be soaked with water until the follow up appointment. Keep incision clean and dry, do not scrub, pat to dry.  After 48 hours, you may wash your hair with gentle rubbing. Rub along the incision and do not pull against the incision line. Wait 6-8 weeks before you color or perm your hair.  If you wear glasses, please remove the ear piece on the surgical side until post op visit with provider. Thi will avoid pressure near the incision while is heals, and healing takes about 2-3 weeks. (Check with nurse if any questions.)        4. Activity Restrictions:  For the next 2 weeks, no heavy lifting more than 5 pounds, no strenuous activities. No nose blowing. Sneeze with your mouth open. Stool softeners as needed to avoid straining with bowel movement. Avoid activities which cause you to bear down or strain, or increase head pressure until first postop (possibly longer)  Keep head of bed up with 1-2 pillows for about 1 week after surgery. Avoid laying flat in bed.     5. Generally, return to work is 1-2 weeks for employment that hearing is not required or has been adapted for hearing loss. Use of implant in the workplace will come in time with cochlear programming/ rehabilitation, each individual has a slightly different rehabilitation course, which will also be dictated by recovery from surgery and your surgeon's recommendations.      6. One to Three weeks after surgery, you will have a follow- up visit with your surgeon. If you are not scheduled, please call the ENT clinic at [phone].         **When to call us:  Call your surgeon  right away if your face feels weak after surgery  Clear fluid coming from your nose or the incision  Redness, pain or any drainage from the incision  Swelling of the wound (some generalized swelling is normal)  Pain that is uncontrolled with medication  Pain or cramping in your legs  A fever of 100 F (37.7 C) for 24 hours or longer  Severe dizziness or problems with balance  Sensitivity along the incision line due to the dissolvable stitches: if you notice dryness, crust or breakdown of any kind along the incision line, please call the clinic for instructions. In most cases, this will go away within 3-4 weeks.         How to Contact Us:  Send a [employer] message to your provider. Our team will respond to you via [employer]. Occasionally, we will need to call you to get further information.  For urgent matters (Monday-Friday), call the ENT Clinic: [phone] and speak with a call center team member - they will route your call appropriately.   If you'd like to speak directly with a nurse, please find our contact information below. We do our best to check voicemail frequently throughout the day, and will work to call you back within 1-2 days. For urgent matters, please use the general clinic phone numbers listed above.      Muriel STARR RN  ENT RN Care Coordinator  Direct: [phone]    Nivia ESPINOZA LPN  Direct: [phone]

## 2023-10-13 NOTE — NURSING NOTE
"Chief Complaint   Patient presents with    Consult     New cochlear implant      .Blood pressure 108/66, pulse 63, temperature 98.8  F (37.1  C), height 1.626 m (5' 4\"), weight 98.4 kg (217 lb), SpO2 97%.    Bonifacio Vasquez LPN    "

## 2023-10-13 NOTE — LETTER
10/13/2023       RE: Nancy Francisco  [address] [facility] [address]     Dear Colleague,    Thank you for referring your patient, Nancy Francisco, to the [facility] EAR NOSE AND THROAT CLINIC [place] at [facility]. Please see a copy of my visit note below.      Neurotology Clinic      Name: Nancy Francisco  MRN: [id number]  Age: 28 year old  : 1995  10/13/23     Chief Complaint:   Consultation    History of Present Illness:   Nancy Francisco is a 28 year old female with a history of bilateral sensorineural hearing loss who presents for consultation regarding cochlear implantation.     Patient reports that she has had hearing loss in both ears since she was a teenager. She began wearing binaural hearing aids at age 18.  She gave birth to her son in  and feels that her hearing loss began to progress around that time.  In , she began having pressure behind her right eye and very loud tinnitus. She then completely lost the hearing in her right ear suddenly. She reports having imaging of her head after this incident. She reports she had a BiCROS hearing aid system at [facility] in [place], but stopped wearing it as she felt like she could not hear clearly with it.    She has not had any vertigo or hearing loss. Her mom has vertigo. While others in her family have hearing loss, no one has had the sudden change that she has had. She only has tinnitus in the right side and it comes on when she moves her head back and forth or up and down. But it is not particularly bothersome to her.    Notes from [facility] Dr. Bianchi state that she had an MRI done in  which was negative.  He also obtain genetic testing and stated that she was heterozygous for 5 mutations but they had uncertain significance for hearing loss.  They discussed cochlear implantation at that time.  [place] would not cover the CI based on her hearing testing results. She has  moved to [place], [place] the [place] border.  She contacted [place] and they indicated they do cover it for her indication.    She has intermittent tinnitus in her right ear, none in the left. She has noise exposure as a  for the past year and does not wear ear plugs. She states she cannot wear ear plugs at her job due to safety concerns.     She has an extensive family history of hearing loss, including her mother, uncle, grandfather, older sister, and daughter. She denies otalgia, otorrhea, aural fullness, dizziness, and past ear surgery.    She previously had a keloid removed from her earlobe in 2019.  She had a  in  and in .       Review of Systems:   Pertinent items are noted in HPI or as in patient entered ROS below, remainder of complete ROS is negative.       10/13/2023    10:07 AM    ENT ROS   Constitutional Problems with sleep   Ears, Nose, Throat Nasal congestion or drainage   Allergy/Immunology Rash   Other Rash        Active Medications:     Current Outpatient Medications:      dexmethylphenidate (FOCALIN) 10 MG tablet, Take 10 mg by mouth 2 times daily, Disp: , Rfl:      lamoTRIgine (LAMICTAL) 100 MG tablet, Take 100 mg by mouth daily, Disp: , Rfl:      propranolol (INDERAL) 10 MG tablet, Take 10 mg by mouth, Disp: , Rfl:      sertraline (ZOLOFT) 100 MG tablet, Take 100 mg by mouth daily, Disp: , Rfl:      sertraline (ZOLOFT) 50 MG tablet, Add this tablet together with the 100mg dose to equal 150mg daily dose, Disp: , Rfl:    Dexmethylphenidate  EpiPen as needed  Motrin  Lamotrigine  Sertraline  Propranolol    Had nausea and vomiting with prior anesthesia.    Allergies:   Tree nuts; has had anaphylaxis but has not needed epi pen because was already at hospital when it happened.  Latex - she gets hives or skin redness if placed on her skin; hand swelling when using latex paint; worst experience at dentist she got hives around her mouth; problem with bandaids and  "blowing up balloons.    Past Medical History:  Attention deficit hyperactivity disorder  Generalized anxiety disorder  Major depressive disorder  Sensorineural hearing loss    Had some hypertrophic scarring around ear gauge site and needed steroid shots; this did not happen at her  site or at site of lip piercings or other sites all done since then.     Past Surgical History:  2 c-sections  1 d&c    Family History:   Hearing loss     Social History:   Social History     Tobacco Use     Smoking status: Every Day     Types: Cigarettes     Smokeless tobacco: Never     Tobacco comments:     Uses vape    She uses tobacco products to help her stay awake during night shift; she vapes nicotine.     Physical Exam:   /66   Pulse 63   Temp 98.8  F (37.1  C)   Ht 1.626 m (5' 4\")   Wt 98.4 kg (217 lb)   SpO2 97%   BMI 37.25 kg/m     Constitutional:  The patient was unaccompanied, well-groomed, and in no acute distress.     Skin: Normal:  warm and pink without rash   Neurologic: Alert and oriented x 3.  CN's III-XII within normal limits.  Voice normal.    Psychiatric: The patient's affect was calm, cooperative, and appropriate.     Communication:  Normal; communicates verbally, normal voice quality.   Respiratory: Breathing comfortably without stridor or exertion of accessory muscles.    Eyes: Pupils were equal and reactive.  Extraocular movement intact.     Ears: Pinnae and tragus non-tender.  EAC's and TM's were clear with normal tympanic membranes     Audiogram:  AUDIOGRAM: She underwent an audiogram in 2023. This demonstrated:    Audio: Right: profound SNHL (complete loss) vibrotactile responses to bone conduction  Left: mild to moderately severe sensorineural hearing loss     Right: Speech reception threshold is CNT dB with CNT% word recognition. Tympanogram A type   Left: Speech reception threshold is 45 dB with 100% word recognition. Tympanogram C type     Audiogram was independently " reviewed    Cochlear implant evaluation was performed by [facility] Hough in [place].  The results were independently reviewed.    Aided thresholds - Left detection in mild sloping to severe rising to mild hearing loss range. Right detection in the un-measureable  hearing loss range.    CNC words: Left 56%, Right 0%, Bilateral 48%.   AzBio sentences in quiet: Left 86%, Right 0%, Bilateral 75%.   AzBio sentences in noise:  Left 55% at +10 dB SNR, 23% at +5 dB SNR. Bilateral at +10 dB SNR.    Imaging: Temporal bone CT scan from today and MRI from 2021 were independently reviewed.     I reviewed temporal bone CT scan and the cochlea has the normal number of turns on the right side and there is an aerated mastoid and appropriately sized facial recess that is amenable for cochlear implant.    I reviewed the MRI from 2021.  This was performed without contrast.  There are fine sections with T2.  When I look at the cochlea on each side, the left cochlea has appropriate T2 signal intensity throughout the basal turn and alternans of the cochlea.  On the right side however there appears to be a lack of T2 signal intensity within the basal turn where the cochlear implant would need to be inserted.  1 questions if this means that there is a filling defect in the arturo tympani although there may be some fluid in the skin vestibular.  Its been 2 years and I would be inclined to have this repeated to determine if there is scar tissue within the basal turn.    Outside records from [facility] were independently reviewed.    Outside imaging was reviewed.     Assessment and Plan:  Nancy Francisco is a 28 year old female with progressive bilateral sensorineural hearing loss punctuated by a right sudden hearing loss 2-1/2 years ago.  She is no longer benefiting sufficiently from hearing aids and has poor word understanding even with well fitted hearing aids in place.  The right ear is considered for cochlear implantation.    We  discussed her test results in detail and that audiometrically her right ear is an excellent candidate for cochlear implantation.  Given the degree of difficulty understanding words in the left ear, the right ear would provide input that could improve her communication ability.  We discussed the range of potential outcomes and benefits from cochlear implantation as well as some of the limitations.    Regarding the evaluation so far, I did discuss with her that her CT anatomy appears amenable for cochlear implantation.  One concern I have is that the previous MRI that was performed without gadolinium enhancement shows a possible filling defect within the basal turn of the right cochlea.  We need to obtain a gadolinium enhanced MRI with high-resolution views of the cochlea as well to assess for any cochlear obstruction or the possibility of a schwannoma.  Our hope is that this proves to be an artifact of the way that the MRI was obtained in the past but obstruction of the basal turn would have implications for the device placement, procedure, and device selected.    Given that we may need an electrode with a stylette, I recommended that she have a Outline implant and she has been inclined towards this as she has been interested in their Bluetooth connectivity is well as the potential of having a button processor.  She does have a thick scalp on her CT and I do have some concern that she would not be able to retain a button processor and thus would need to have a behind-the-ear model.  We would have a 612 and 622 available.    We talked about what cochlear implant surgery entails in detail, each step, as well as the common and serious risks.  The risks and benefits of the procedure were discussed.  The risks include but are not limited to:  Expected loss of residual hearing, worsened tinnitus which may improve with activation of the device, dizziness, damage to the taste nerve, damage to the facial nerve,  CSF leak, infection with need for explanation and reimplantation, device failure, and possible need for further surgery.  It was also discussed that all implant recipients are at greater risk for meningitis and the need for pneumococcal vaccination.  The patient understood the discussion and had a chance to have her questions answered.  We will begin the scheduling process.      Lazara Holley MD  Otology & Neurotology  [facility]      Again, thank you for allowing me to participate in the care of your patient.      Sincerely,    Lazara Holley MD

## 2023-10-25 ENCOUNTER — OFFICE VISIT (OUTPATIENT)
Dept: AUDIOLOGY | Facility: CLINIC | Age: 28
End: 2023-10-25
Payer: MEDICAID

## 2023-10-25 DIAGNOSIS — H90.3 SENSORINEURAL HEARING LOSS, BILATERAL: Primary | ICD-10-CM

## 2023-10-25 PROCEDURE — V5010 ASSESSMENT FOR HEARING AID: HCPCS | Performed by: AUDIOLOGIST

## 2023-10-25 NOTE — PROGRESS NOTES
AUDIOLOGY REPORT    SUBJECTIVE: Nancy Francisco is a 28 year old female was seen in the Audiology Clinic at  [facility] on 10/25/23 for a cochlear implant device selection. Nancy was seen for a cochlear implant evaluation on 8/16/23 and she was found to be a candidate for a cochlear implant in her right ear. She has profound sensorineural hearing loss in the right ear and mild sloping to severe rising to moderate sensorineural hearing loss in the left ear. She was evaluated by Dr. Lazara Holley on 10/13/23. Dr. Holley recommended a Cochlear brand implant for the right ear since there is a stylette.     Nancy doesn't currently use any hearing aids. She was previously fit with a BiCROS when she lived in [place] a few years ago. She didn't feel that the BiCROS helped to make things more clear for her.     Nancy reports that she continues to work as a . She is hoping to find a new job in the near future.    OBJECTIVE:  Cochlear implant device selection completed. Nancy is interested in getting a Cochlear brand implant. She is interested in bluetooth connectivity. She has a Xyleme 6 phone which is listed on the compatibility list for the Cochlear brand processors. Nancy's device selections are below.    The cochlear implant mutually chosen were:  Right: Cochlear Nucleus 8 and Kanso 2  COLOR: black for both processors  BATTERY SIZE: 1 standard and 1 power extend rechargeable battery for the N8 processor  ACCESSORIES: TV Streamer, N8 Aqua+ Kit, and Kanso 2 Portable     Nancy may be interested in pursuing a new hearing aid for her left ear at some point. She is aware that ReSound hearing aids work with Cochlear accessories. She is told that she may be able to get a hearing aid at this clinic. Otherwise she may have to pursue a hearing aid in [place] that is where she lives.    ASSESSMENT: Cochlear implant device selection completed. Order form completed.    PLAN: Nancy's cochlear  implant order form will be forwarded to Huong Simons. She should be contacted soon to schedule her cochlear implant surgery and follow up appointments. Please contact this clinic with any questions or concerns.      Juan Pablo Roblero, NOLAN-A  [place] Licensed Audiologist #[id number]

## 2023-11-03 ENCOUNTER — HOSPITAL ENCOUNTER (OUTPATIENT)
Dept: MRI IMAGING | Facility: CLINIC | Age: 28
Discharge: HOME | End: 2023-11-03
Attending: OTOLARYNGOLOGY | Admitting: OTOLARYNGOLOGY
Payer: MEDICAID

## 2023-11-03 DIAGNOSIS — H90.3 SENSORINEURAL HEARING LOSS (SNHL) OF BOTH EARS: ICD-10-CM

## 2023-11-03 PROCEDURE — 255N000002 HC RX 255 OP 636: Mod: JZ | Performed by: OTOLARYNGOLOGY

## 2023-11-03 PROCEDURE — A9585 GADOBUTROL INJECTION: HCPCS | Mod: JZ | Performed by: OTOLARYNGOLOGY

## 2023-11-03 PROCEDURE — 70553 MRI BRAIN STEM W/O & W/DYE: CPT

## 2023-11-03 RX ORDER — GADOBUTROL 604.72 MG/ML
9 INJECTION INTRAVENOUS ONCE
Status: COMPLETED | OUTPATIENT
Start: 2023-11-03 | End: 2023-11-03

## 2023-11-03 RX ADMIN — GADOBUTROL 9 ML: 604.72 INJECTION INTRAVENOUS at 18:51

## 2023-11-16 ENCOUNTER — TELEPHONE (OUTPATIENT)
Dept: OTOLARYNGOLOGY | Facility: CLINIC | Age: 28
End: 2023-11-16
Payer: MEDICAID

## 2023-11-17 ENCOUNTER — VIRTUAL VISIT (OUTPATIENT)
Dept: OTOLARYNGOLOGY | Facility: CLINIC | Age: 28
End: 2023-11-17
Payer: MEDICAID

## 2023-11-17 VITALS — WEIGHT: 220 LBS | BODY MASS INDEX: 37.56 KG/M2 | HEIGHT: 64 IN

## 2023-11-17 DIAGNOSIS — H90.3 SENSORINEURAL HEARING LOSS (SNHL), BILATERAL: Primary | ICD-10-CM

## 2023-11-17 DIAGNOSIS — R90.89 ABNORMAL FINDING ON MRI OF BRAIN: ICD-10-CM

## 2023-11-17 DIAGNOSIS — H90.3 ASYMMETRICAL SENSORINEURAL HEARING LOSS: ICD-10-CM

## 2023-11-17 PROCEDURE — 99207 PR NO BILLABLE SERVICE THIS VISIT: CPT | Performed by: OTOLARYNGOLOGY

## 2023-11-17 ASSESSMENT — PAIN SCALES - GENERAL: PAINLEVEL: NO PAIN (0)

## 2023-11-17 NOTE — PROGRESS NOTES
Nancy is a 28 year old who is being evaluated via a billable telephone visit.      What phone number would you like to be contacted at? [phone]  How would you like to obtain your AVS? MyChart    Distant Location (provider location):  On-site     Phone call duration: 14 minutes    See clinic note for additional details.

## 2023-11-17 NOTE — LETTER
11/17/2023       RE: Nancy Francisco  [address] [facility] [address]     Dear Colleague,    Thank you for referring your patient, Nancy Francisco, to the [facility] EAR NOSE AND THROAT CLINIC [place] at [facility]. Please see a copy of my visit note below.    Nancy is a 28 year old who is being evaluated via a billable telephone visit.      What phone number would you like to be contacted at? [phone]  How would you like to obtain your AVS? MyChart    Distant Location (provider location):  On-site     Phone call duration: 14 minutes    See clinic note for additional details.            Again, thank you for allowing me to participate in the care of your patient.      Sincerely,    Lazara Holley MD

## 2023-11-17 NOTE — PATIENT INSTRUCTIONS
You were seen in the ENT Clinic today by Dr. Holley. If you have any questions or concerns after your appointment, please contact us (see below)    How to Contact Us:  Send a [employer] message to your provider. Our team will respond to you via [employer]. Occasionally, we will need to call you to get further information.  For urgent matters (Monday-Friday), call the ENT Clinic: [phone] and speak with a call center team member - they will route your call appropriately.   If you'd like to speak directly with a nurse, please find our contact information below. We do our best to check voicemail frequently throughout the day, and will work to call you back within 1-2 days. For urgent matters, please use the general clinic phone numbers listed above.      Muriel STARR RN  ENT RN Care Coordinator  Direct: [phone]    Nivia ESPINOZA LPN  Direct: [phone]

## 2023-11-21 ENCOUNTER — TRANSCRIBE ORDERS (OUTPATIENT)
Dept: OTHER | Age: 28
End: 2023-11-21

## 2023-11-21 DIAGNOSIS — H90.5 SNHL (SENSORINEURAL HEARING LOSS): Primary | ICD-10-CM

## 2023-11-21 DIAGNOSIS — D33.3 VESTIBULAR SCHWANNOMA (H): ICD-10-CM

## 2023-11-28 ENCOUNTER — TELEPHONE (OUTPATIENT)
Dept: OTOLARYNGOLOGY | Facility: CLINIC | Age: 28
End: 2023-11-28
Payer: MEDICAID

## 2023-11-28 NOTE — TELEPHONE ENCOUNTER
Patient is scheduled for surgery with Dr. Holley.     Spoke with: Patient    Date of Surgery: 1/03/24    Location: [facility] OR     Pre op with Provider: FINN     H&P: Patient is scheduled for an in clinic visit with PAC on 12/22/23 at 2:45 PM.     Additional imaging/appointments: Patient needs a 3 week post op appointment.     Surgery packet: Per patients preference, will send packet through [employer]. Patient made aware arrival time will not be listed within packet.      Additional comments: Writer informed patient arrival time may be given at PAC appointment and if not, a pre op nurse will call a few days prior to surgery to go over further details/give arrival time.     Patient asked for a call back to confirm if genetic testing appointment is still required prior to surgery and if so, is surgery being on 1/03/24 enough time to complete appt. Let patient know a message would be sent to Dr. Holley nurse to confirm further their plan with testing.         Radha Tan on 11/28/2023 at 11:37 AM

## 2023-11-29 NOTE — TELEPHONE ENCOUNTER
FUTURE VISIT INFORMATION      SURGERY INFORMATION:  Date: 1/3/24  Location: [facility] OR  Surgeon:  Lazara Holley MD  Anesthesia Type:  General  Procedure: RIGHT INSERTION, IMPLANT, COCHLEAR, WITH INTRAOPERATIVE FACIAL NERVE MONITORING   Consult: 11/17/23    RECORDS REQUESTED FROM:       [facility]

## 2023-11-29 NOTE — TELEPHONE ENCOUNTER
Called patient regarding upcoming surgery on 1/03/24 with Dr. Holley. Confirmed with Dr. Holley nurse patient does need to be seen for genetic testing prior to surgery. Patient has been scheduled with Dr. Jeong's team on 1/17/24. Because of this, patient will need to reschedule surgery into March.   No answer, callback number [phone] left on  for patient.     Radha Tan on 11/29/2023 at 10:15 AM

## 2023-12-01 NOTE — TELEPHONE ENCOUNTER
Called patient regarding rescheduling surgery with Dr. Holley. Informed patient that per Dr. Holley nurse, it is preferred that genetic testing be done prior to surgery and because of that we would like to reschedule patients surgery into March. Patient offered reschedule date of 3/20/24, patient confirmed that date works well. Patient was also informed that an appointment has been made on her behalf with Dr. Jeong's team on 1/17/24.   Patient asked for a call back to confirm if that appointment can possibly be moved to a Friday as it works better with her work schedule. Patient also wants to confirm how the testing will be done, blood draw or what is to be expected. Patient asked to be called back at [phone].     Radha Tan on 12/1/2023 at 1:39 PM

## 2023-12-05 NOTE — TELEPHONE ENCOUNTER
FUTURE VISIT INFORMATION        SURGERY INFORMATION:  Date: 3/20/24  Location: [facility] OR  Surgeon:  Lazara Holley MD  Anesthesia Type:  General  Procedure: RIGHT INSERTION, IMPLANT, COCHLEAR, WITH INTRAOPERATIVE FACIAL NERVE MONITORING   Consult: 11/17/23     RECORDS REQUESTED FROM:         [facility]

## 2023-12-22 ENCOUNTER — PRE VISIT (OUTPATIENT)
Dept: SURGERY | Facility: CLINIC | Age: 28
End: 2023-12-22

## 2024-01-03 DIAGNOSIS — R42 VERTIGO: ICD-10-CM

## 2024-01-03 DIAGNOSIS — R42 DIZZINESS: ICD-10-CM

## 2024-01-03 DIAGNOSIS — H90.3 SENSORINEURAL HEARING LOSS (SNHL), BILATERAL: Primary | ICD-10-CM

## 2024-01-16 NOTE — PROGRESS NOTES
"Name:  Nancy Francisco  :   1995  MRN:   [id number]  Date of service: 2024  Referring Provider: No ref. 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  This consultation was requested by Dr. Lazara Holley in Otolaryngology at the patient's visit on 10/13/2023. Nancy attended this in-person visit alone.    I met with her at the request of Dr. Jeong to discuss personal and family medical history, review possible genetic contributions to her symptoms, and to obtain informed consent for genetic testing, if indicated. History is obtained from Nancy and review of the medical record.     ----------------------------------------------------    Plan  At today's visit, we discussed the following plan:   I will obtain records from previous genetic testing through [facility]. Nancy signed an SUREKHA at today's visit.   NF2, SMARCB1, LZTR1 genetic analysis through [facility]. I have notified the lab's team to remove genes from the panel if they have been tested through [facility] previously.   Other follow up per Dr. Jeong.     ----------------------------------------------------    Personal History  For additional details, review note from Dr. Jeong dated 2024.  To summarize, Nancy has a history of the following:    Patient Active Problem List   Diagnosis    SNHL (sensorineural hearing loss)     Nancy reports beginning to wear hearing aids at age 19 and that hearing loss due to \"pressure buildup\" is a known phenomenon in her family (her mother got hearing aids in her 30s, her older sister got hearing aids in her mid-20s, her daughter got hearing aids at 7, her maternal uncle got hearing aids at 40, and her maternal grandfather got hearing aids at an " "unknown age, later in life.     Nancy reports that she had genetic testing given her daughter's Dandy-Walker malformation as well as genetic testing for hearing loss. Neither of these results were available for review at today's visit, but I will attempt to obtain them. She now has HL on both sides and it has gotten worse over the years.   Numbness/complete loss of hearing on the right side.     Nancy has no other health concerns to report at today's visit. Dermatologically, no lesions reported. She has some issues with dry skin and has a history of a keloid scar formation after injury to the earlobe. She notes some concern for vision - she is supposed to wear glasses but \"can see okay\" without them; they were damaged at work and are not yet replaced. Nancy has no facial palsy/concerns, no shortness of breath, no gastrointestinal or urological concerns. She does not menstruate due to an IUD.     Nancy rarely uses alcohol and occasionally vapes, though she is considering swapping to a non-nicotine vape. She reports occasional small dose of edible Delta 8 tetrahydrocannabinol for relaxation/stress management.     Nancy has a history of episodic dizziness/vertigo when seated in a slouched position with feet up. She reports a recent episode of dizziness without easy resolution and an accompanying headache. She has had no recent episodes (within the last few weeks) and has minimal headaches outside of these episodes. She notes she used to have chronic migraine but does not experience this often now.     Pregnancy/ History  No known concerns. Likely non-contributory.     Relevant Imaging & Workup  MRI Brain - 23  \"IMPRESSION: 1.  Right middle and basal turn of the cochlea demonstrates subtle abnormal signal and pronounced enhancement. Findings may reflect fibrosis, infection/inflammation process, or neoplasm (i.e. typically schwannoma).  2.  Remaining inner ear structures bilaterally appear within normal limits. " "3.  Unremarkable MRI of the brain.\"    CT Temporal - 10/13/23  \"Impression: Normal CT study of the temporal bones.\"     Previous Genetic Testing  Genetic testing specifically ordered to evaluate for hearing loss provided \"Heterozygous for 5 mutations but they had uncertain significance for hearing loss\". Records unavailable for review at today's visit.     Social History  Nancy is a  and has two children. She enjoys crafts, including supriya and making her own jewelry.     Father available for testing: Yes  Mother available for testing: Yes  Full sibling available for testing: Yes   Half sibling available for testing: Yes    Family History  A standard three generation pedigree was obtained and is scanned into the medical record.      Children: Nancy has an eight year old daughter with a Dandy-Walker malformation and hearing loss. She also had congenital missing tibia and fibula and has developmental delays. Nancy has a three year old son who is well. No known hearing concerns for him at this time.   Siblings: Nancy has three older paternal half sisters and three full sisters.   Full sisters: Older sister has hearing loss which began in her mid-20s; she is now 30. Other two are in their 20s, no major health concerns. No children reported.   Half sisters: No major health concerns and no hearing loss for these individuals or their children.     Paternal:  Father: Living at 62. He had a heart attack in his mid-30s and had a stent placed.   Paternal grandfather: Recurrent bladder cancer. He is living in 90s. He also has some degree of hearing loss but would likely have been age related. May have a history of heart attack but patient is unsure.   Paternal grandmother: 2x heart attacks alongside metastatic bladder cancer;  in her mid-60s.   Paternal relatives: Father has two full sibs, one sister that he is not close to but no known hearing loss. An uncle with no major health problems either. Most of father's " "side of family has heart problems or cancer.     Maternal:  Mother: Mother is living at age 50; she has hearing loss and gets vertigo often. She had ulcers at one point. No other major health concerns. A few skin tags on her neck were removed and did not grow back.   Maternal grandfather: Had hearing loss at an uncertain age; no other major concerns.   Maternal grandmother: No major medical concerns.   Maternal relatives: One uncle with prostate/bladder cancer and hearing loss. One aunt with no major medical concerns.     There are no additional reports of family members with hearing loss, tumorigenesis, or other major medical concerns. No intellectual disability or developmental delay beyond patient's daughter. No one with movement or balance concerns.     Background Information  Every cell in our body contains a complete set of the instructions that our body needs to function.  These instructions come in the form of our DNA, or long, double-stranded chains of chemical compounds. Portions of DNA that code for a specific product or have a known function are called genes.       Since there's so much information that goes into human functioning and since every tiny cell needs a complete set, our DNA is tightly wound into compact structures called chromosomes. Most people have 46 chromosomes, with 23 coming from each parent. The 23rd pair are sometimes referred to as the \"sex chromosomes\", as they typically determine biological sex development (XX and XY). Sometimes, changes to chromosomes (like deleted pieces, duplicated pieces, or entire extra chromosomes) can cause genetic instructions to no longer work. When this occurs, a genetic disorder is possible. This type of change is called a copy number variant, because a person would not have the expected number (two) of copies of a gene.     Genetic disorders can also be caused by a single change in a single gene, like a typo in a word. These may be called point " mutations, missense variants, or nonsense variants; the name usually depends on the effect the change has on the body's instructions. The genetic disorders caused by this type of change are often called single gene disorders.     Genetic changes can come from either parent or be brand new in a person. When a change is brand new in an individual, it's called a de derrek change. For some conditions, both copies of a gene (both the one from mother and the one from father) need to be altered to show a trait or disease. This is called autosomal recessive inheritance. Other conditions just require one copy of a gene to be changed in order to show a trait or disease. This is called autosomal dominant inheritance.     Neurofibromatosis Type 2 & Other Schwannomatosis  Neurofibromatosis type 2 (NF2) is a progressive autosomal dominant disorder which predisposes to schwannomas, meningiomas, and ependymomas.  The hallmark feature of NF2 is bilateral vestibular schwannomas which progressively enlarge leading to sensorineural hearing loss and deafness. Learning disabilities are uncommon.    A clinical diagnosis of NF2, initially developed by the NIH, is established by the following criteria (modified by Harley et al in 2000 and Niki et al in 2022):    Clinical criteria of NF2  Bilateral vestibular schwannomas (VS)  An identical NF2 pathogenic variant in at least 2 anatomically distinct NF2-related tumors (schwannoma, meningioma, ependymoma)  2 major or 1 major with 2 minor criteria:  Major - unilateral VS, first-degree relative other than sibling with NF2-related schwannomatosis, 2+ meningiomas (single meningioma is a minor criteria), or NF2 pathogenic variant in unaffected tissue such as blood  Minor - Ependymoma, schwannoma, meningioma (multiple meningiomas are a MAJOR) can be counted as distinct minor criteria (ie two distinct schwannomas = 2 minor criteria). Juvenile subcapsular or cortical cataract, retinal hamartoma,  epiretinal membrane in individuals under age 40.    Occasionally, schwannomas can develop that are not related to NF2. Other genes involved in schwannoma development include SMARCB1 and LZTR1. Meningiomas can develop in individuals with SMARCB1-related schwannomatosis, but have not been observed in LZTR1-related schwannomatosis.     Clinical criteria of schwannomatosis (SMARCB1, LZTR1)   Patient must meet at least 1 of the following criteria:    At least one pathologically confirmed schwannoma or hybrid nerve sheath tumor and a SMARCB1 or LZTR1 pathogenic variant in an unaffected tissue (such as blood)  A shared SMARCB1 or LZTR1 pathogenic variant in 2 schwannomas or hybrid nerve sheath tumors        As the average age of onset of findings in individuals with NF2 is 18 to 24 years, ongoing surveillance for tumors and hearing loss is needed to allow for early diagnosis and intervention.  Because NF2 is considered to be an adult-onset disease, it may be under-recognized in children, in whom skin tumors or ocular findings may be the initial manifestations.      MRI screening for at risk individuals is typically not recommended until age 10, unless needed for diagnostic purposes, to assess concerning symptoms or when the individual is known to be a member of a severely affected family.    Initial symptoms of vestibular schwannoma include tinnitus, hearing loss, and balance dysfunction. With time, vestibular tumors extend medially into the cerebellar pontine angle and, if left untreated, cause compression of the brain stem and hydrocephalus. Schwannomas may also develop on other cranial and peripheral nerves, with sensory nerves more frequently affected than motor nerves.     At least two-thirds of individuals with NF2 develop spinal tumors, which are often the most devastating and difficult aspects of this condition to manage. The most common spinal tumors are schwannomas, which usually originate within the  intravertebral canal on the dorsal root and extend both medially and laterally, taking the shape of a dumbbell. Intramedullary tumors of the spinal cord such as astrocytoma and ependymoma occur in 5-33% of individuals with NF2. Most individuals with spinal cord involvement have multiple tumors identified on imaging studies, but these tumors can remain asymptomatic.    Approximately half of individuals with NF2 develop maeningiomas. Most are intracranial; however, spinal meningiomas also occur. Meningiomas in the orbit may compress the optic nerve and result in visual loss. Meningioma may be the presenting feature of NF2, particularly in childhood.    One-third of individuals with NF2 have decreased visual acuity in one or both eyes. Posterior subcapsular lens opacities, rarely progressing to a visually significant cataract,  are the most common ocular finding. Lens opacities may appear prior to the onset of symptoms from vestibular schwannoma and can be seen in children. Retinal hamartoma and epiretinal membrane are seen in up to one-third of individuals with NF2.     An increasingly recognized feature of NF2 is a mononeuropathy occurring particularly in childhood and frequently presenting as a facial palsy that usually only partially recovers, a third nerve palsy or a foot or hand drop. A progressive polyneuropathy of adulthood not directly related to tumor masses is also being increasingly described.    NF2 is caused by a pathogenic variant, or mutation, in the NF2 gene.  Genetic testing for NF2 is available and currently detects the disease-causing mutation in 93% of individuals with a clinical diagnosis of NF2.  Therefore, if a pathogenic variant is identified, a diagnosis is confirmed.  However, if no pathogenic variant is found, a diagnosis of NF2 cannot be completely excluded.  Somatic mosaicism is common in NF2 and therefore, testing is usually most informative in non- patients or in tumor  sample.    As this is an autosomal dominant condition, affected individuals have a 50% chance to pass the condition to their offspring.  There is significant variability in NF2.    Genetic Testing  Genetic testing can take many forms. We can look at chromosomes to see if there are any pieces missing or extra (microarray test) or see how chromosomes are arranged (karyotype test). We can also look at specific genes to see if there are changes to the sequence causing the instruction to no longer work (sequencing, deletion or duplication analysis). Often, we look at multiple genes at once (a panel test). Sometimes, we look at every known gene within a person via a test called whole exome sequencing (RUPINDER). Typically, with schwannomas, we analyze sequence and deletion/duplication of three genes: NF2, LZTR1, SMARCB1.     We reviewed the following information about next-generation sequencing for NF2/schwannomatosis.  Benefits: Treatment and surveillance recommendations, lifestyle recommendations, a sense of what to expect, and providing family planning information.  Risks: We reviewed privacy and data use policies and discussed that no data is 100% secure, but genetic data is highly protected information. Reviewed ELI and discussed insurance considerations. We also reviewed that genetic testing introduces a risk of learning information you don't want, for instance, a condition worsening over time.   Limitations: People may have a condition that is genetic not something we know to look for or looked for on this test. No test can tell us everything and no test is perfect but our current testing methodologies are highly sensitive/specific.     There are three types of results:  Negative: meaning no pathogenic variants were identified in the genes that were tested  A negative result does not rule out the possibility that Nancy's symptoms are due to a genetic etiology and cannot rule out a segmental or mosaic  condition.  Positive: meaning one (or more) pathogenic variants were identified in the genes that were tested that are associated with Nancy's symptoms  We discussed that a positive result could provide an etiology to Nancy's symptoms, give anticipatory guidance as to their potential progression, and clarify risks to family members.  We also discussed that a positive result could indicate that Nancy is at risks for other health concerns, outside of their presenting symptoms.  Uncertain: meaning one (or more) variants were identified in the genes that were tested, but there is not enough data to know if this variant is disease-causing; these are called variants of uncertain significance (VUS)  In most cases, identification of a VUS does not confirm a diagnosis and does not result in any clinically actionable recommendations.  The variant will be monitored over time to see if more information is known about it in the future.  If a VUS is identified, testing of other relatives may be helpful to provide clarification.    Nancy expressed an excellent understanding of this information and agreed to the plan as set forth by Dr. Jeong and I and detailed at the beginning of this note. Management and surveillance of Nancy Francisco will depend on the genetic test results. Test results can also help predict the recurrence risk for family members to have a child with similar healthcare needs.    It was a pleasure meeting with Nancy today. She vocalized understanding of the information we discussed and her questions and concerns were addressed. She has been provided with my contact information should any questions arise regarding our visit or plan moving forward. In total, I spent 35 minutes in face-to-face counseling with this patient.     Lexi Neumann MS, [facility]  Genetic Counselor - [facility]  Phone: [phone]  Email: [email or web address]    Lab results may be automatically released via [employer]  Department protocol is to  hold genetic testing results until we have reviewed them. We will then contact the family directly to disclose the results and ensure they receive a copy of the report. This protocol was reviewed with the family, who were in agreement to hold the results for genetics review and direct contact.    References  Niki Smith, Biju MCCLOUD, et al. LZTR1- and SMARCB1-Related Schwannomatosis. 2018 Mar 8 [Updated 2023 Jul 27]. In: Rashaad BAUTISTA, Ru ADRIAN, Amita MCGHEE, et al., editors. [employer]  [Internet]. [facility]): [facility]; 2595-0320. Available from: https://www.ncbi.nlm.nih.gov/books/QRD347536/    Eduar CASTRO. NF2-Related Schwannomatosis. 1998 Oct 14 [Updated 2023 Apr 20]. In: Ru Neil MP, GM, Amita MCGHEE, et al., editors. [employer]  [Internet]. [facility]): [facility]; 8427-2442. Available from: https://www.ncbi.nlm.nih.gov/books/COK9366/    Niki SR, Shira L, Lucrecia E, Jacky P, Estrada RA, Dayana FREDY, Ernesto-Fay D, Rosita R, Martín MJ, Malinda JM, Allan M, Aline DH, Fredy CO, Alannah M, Kehrer-Sawatzki H, Rony BR, Bonnie VF, Dania M, Lang L, Ana KA, Luis V, Schmichelle E, Cruz MJ, Eli-Eduarhamimethan A, William DA, Ullrich NJ, Clarence D, Charan K, Princess K; International Consensus Group on Neurofibromatosis Diagnostic Criteria (I-NF-DC); Tramaine SIMPSON, Andres P, Eduar CASTRO. Updated diagnostic criteria and nomenclature for neurofibromatosis type 2 and schwannomatosis: An international consensus recommendation. [employer]. 2022 Sep;24(9):8997-7691. doi: 10.1016/j.gim.2022.05.007. Epub 2022 Jun 9. PMID: 98786364.

## 2024-01-17 ENCOUNTER — ONCOLOGY VISIT (OUTPATIENT)
Dept: PEDIATRIC HEMATOLOGY/ONCOLOGY | Facility: CLINIC | Age: 29
End: 2024-01-17
Attending: PEDIATRICS
Payer: MEDICAID

## 2024-01-17 VITALS
WEIGHT: 211.86 LBS | SYSTOLIC BLOOD PRESSURE: 113 MMHG | RESPIRATION RATE: 16 BRPM | HEART RATE: 93 BPM | HEIGHT: 64 IN | TEMPERATURE: 98.9 F | DIASTOLIC BLOOD PRESSURE: 83 MMHG | BODY MASS INDEX: 36.17 KG/M2 | OXYGEN SATURATION: 98 %

## 2024-01-17 DIAGNOSIS — Z71.83 ENCOUNTER FOR NONPROCREATIVE GENETIC COUNSELING AND TESTING: ICD-10-CM

## 2024-01-17 DIAGNOSIS — H90.3 SENSORINEURAL HEARING LOSS (SNHL) OF BOTH EARS: ICD-10-CM

## 2024-01-17 DIAGNOSIS — D36.10 SCHWANNOMA: ICD-10-CM

## 2024-01-17 DIAGNOSIS — H90.3 SENSORINEURAL HEARING LOSS (SNHL) OF BOTH EARS: Primary | ICD-10-CM

## 2024-01-17 DIAGNOSIS — Z13.71 ENCOUNTER FOR NONPROCREATIVE GENETIC COUNSELING AND TESTING: ICD-10-CM

## 2024-01-17 PROCEDURE — 36415 COLL VENOUS BLD VENIPUNCTURE: CPT | Performed by: PEDIATRICS

## 2024-01-17 PROCEDURE — 99204 OFFICE O/P NEW MOD 45 MIN: CPT | Performed by: PEDIATRICS

## 2024-01-17 PROCEDURE — 96040 HC GENETIC COUNSELING, EACH 30 MINUTES: CPT

## 2024-01-17 PROCEDURE — G0463 HOSPITAL OUTPT CLINIC VISIT: HCPCS | Performed by: PEDIATRICS

## 2024-01-17 RX ORDER — DEXTROAMPHETAMINE SACCHARATE, AMPHETAMINE ASPARTATE, DEXTROAMPHETAMINE SULFATE AND AMPHETAMINE SULFATE 2.5; 2.5; 2.5; 2.5 MG/1; MG/1; MG/1; MG/1
10 TABLET ORAL
COMMUNITY
Start: 2024-01-15

## 2024-01-17 ASSESSMENT — PAIN SCALES - GENERAL: PAINLEVEL: NO PAIN (0)

## 2024-01-17 NOTE — NURSING NOTE
"Chief Complaint   Patient presents with    New Patient     /83 (BP Location: Right arm, Patient Position: Sitting, Cuff Size: Adult Large)   Pulse 93   Temp 98.9  F (37.2  C) (Oral)   Resp 16   Ht 1.614 m (5' 3.54\")   Wt 96.1 kg (211 lb 13.8 oz)   HC 59.5 cm (23.43\")   SpO2 98%   BMI 36.89 kg/m      No Pain (0)  Data Unavailable    I have reviewed the patients medications and allergies    Height/weight double check needed? No    Peds Outpatient BP  1) Rested for 5 minutes, BP taken on bare arm, patient sitting (or supine for infants) w/ legs uncrossed?   Yes  2) Right arm used?  Right arm   Yes  3) Arm circumference of largest part of upper arm (in cm):   4) BP cuff sized used: Large Adult (32-43cm)   If used different size cuff then what was recommended why? N/A  5) First BP reading:machine   BP Readings from Last 1 Encounters:   01/17/24 113/83      Is reading >90%?No   (90% for <1 years is 90/50)  (90% for >18 years is 140/90)  *If a machine BP is at or above 90% take manual BP  6) Manual BP reading: N/A  7) Other comments: None          Magalys Puente CMA  January 17, 2024    "

## 2024-01-17 NOTE — PROGRESS NOTES
HPI  Nancy Francisco is a 28 year old with a history of (questionable) right-sided cochlear schwannoma who presents to the clinic following a visit with Dr. Lazara Holley on 11/17/2023.     Nancy reports hearing loss in her right ear starting in her teens, for which she received a hearing aid at age 19. In 2020, following the birth of her son, she suddenly lost all hearing in her right ear. She is the only one in her family with complete deafness on one side (everyone else has partial). She is being worked up for cochlear implant surgery, and during this workup an MRI in 11/23 revealed a potential right cochlear schwanomma. Left side appears clear on MRI. She also has some hearing loss in her left ear.    Aside from hearing loss, Nancy is healthy. She reports a hx of migraines but denies headaches recently. Has occasional episodes of vertigo with no identifiable triggers other than sitting. Had a keloid scar in 2019 d/t clement earring. No other symptoms to report.    Fam/soc:   -Sensorineural hearing loss throughout mom's side: mom (around age 30), maternal uncle, maternal grandma  -Has an 8 year old daughter with Dandy-Walker malformation and who received hearing aids at age 7 and has an above knee amputation; 3 year old son is healthy  -Hx of bladder cancer in paternal grandparents  -Social: uses vape pen occasionally, drinks alcohol once every 6 months, uses marijuana occasionally    -allergic to tree nuts    Current Outpatient Medications   Medication     dexmethylphenidate (FOCALIN) 10 MG tablet     lamoTRIgine (LAMICTAL) 100 MG tablet     propranolol (INDERAL) 10 MG tablet     sertraline (ZOLOFT) 100 MG tablet     sertraline (ZOLOFT) 50 MG tablet     No current facility-administered medications for this visit.      ROS  Review Of Systems  Skin: positive for hx keloid scar 2019, dry skin  Eyes: mild vision loss, needs glasses  Ears/Nose/Throat: bilateral hearing loss, complete on R side and partial on L side  "(described in HPI)  Respiratory: No shortness of breath, dyspnea on exertion, cough, or hemoptysis  Cardiovascular: negative  Gastrointestinal: negative  Genitourinary: amenorrhea secondary to IUD  Musculoskeletal: reports mild chronic back pain  Neurologic: negative  Psychiatric: negative  Hematologic/Lymphatic/Immunologic: negative  Endocrine: negative      /83 (BP Location: Right arm, Patient Position: Sitting, Cuff Size: Adult Large)   Pulse 93   Temp 98.9  F (37.2  C) (Oral)   Resp 16   Ht 1.614 m (5' 3.54\")   Wt 96.1 kg (211 lb 13.8 oz)   HC 59.5 cm (23.43\")   SpO2 98%   BMI 36.89 kg/m      Physical Exam  Constitutional:       Appearance: Normal appearance.   HENT:      Head: Normocephalic.      Nose: Nose normal.   Eyes:      Extraocular Movements: Extraocular movements intact.      Conjunctiva/sclera: Conjunctivae normal.      Pupils: Pupils are equal, round, and reactive to light.   Cardiovascular:      Rate and Rhythm: Normal rate and regular rhythm.      Heart sounds: Normal heart sounds.   Pulmonary:      Effort: Pulmonary effort is normal.      Breath sounds: Normal breath sounds.   Abdominal:      General: Abdomen is flat.   Musculoskeletal:         General: Normal range of motion.   Neurological:      General: No focal deficit present.      Mental Status: She is alert and oriented to person, place, and time. Mental status is at baseline.   Psychiatric:         Mood and Affect: Mood normal.       Impression:  1. Potential R sided cochlear schwannoma  2. Familial sensorineural hearing loss     Plan:  Given Nancy's MRI (showing unilateral potential cochlear schwannoma) and generally negative ROS, there is low suspicion at this time for NF2. More likely some type of familial sensorineural hearing loss. Will send genetic testing. Can proceed with cochlear implant surgery as planned. No follow up needed unless genetic test results are positive for NF2.      This patient was seen and discussed " with Dr. Dionicio Jeong.  Susan Rios, MS3  [facility] [employer]    ----- Services Performed and Documented by Medical Student in Presence of ATTENDING Physician-------    I have reviewed the history and the physical exam. I discussed the plan with the genetic counselor. I agree with the documentation noted above.      Total time = 45 minutes, including preparation, communication, and reviewing imaging.    Trent Jeong MD

## 2024-01-17 NOTE — LETTER
1/17/2024      RE: Nancy Francisco  [address] [place] [id number]     Dear Colleague,    Thank you for the opportunity to participate in the care of your patient, Nancy Francisco, at the [facility] at [facility]. Please see a copy of my visit note below.    HPI  Nancy Francisco is a 28 year old with a history of (questionable) right-sided cochlear schwannoma who presents to the clinic following a visit with Dr. Lazara Holley on 11/17/2023.     Nancy reports hearing loss in her right ear starting in her teens, for which she received a hearing aid at age 19. In 2020, following the birth of her son, she suddenly lost all hearing in her right ear. She is the only one in her family with complete deafness on one side (everyone else has partial). She is being worked up for cochlear implant surgery, and during this workup an MRI in 11/23 revealed a potential right cochlear schwanomma. Left side appears clear on MRI. She also has some hearing loss in her left ear.    Aside from hearing loss, Nancy is healthy. She reports a hx of migraines but denies headaches recently. Has occasional episodes of vertigo with no identifiable triggers other than sitting. Had a keloid scar in 2019 d/t clement earring. No other symptoms to report.    Fam/soc:   -Sensorineural hearing loss throughout mom's side: mom (around age 30), maternal uncle, maternal grandma  -Has an 8 year old daughter with Dandy-Walker malformation and who received hearing aids at age 7 and has an above knee amputation; 3 year old son is healthy  -Hx of bladder cancer in paternal grandparents  -Social: uses vape pen occasionally, drinks alcohol once every 6 months, uses marijuana occasionally    -allergic to tree nuts    Current Outpatient Medications   Medication    dexmethylphenidate (FOCALIN) 10 MG tablet    lamoTRIgine (LAMICTAL) 100 MG tablet    propranolol (INDERAL) 10 MG tablet     "sertraline (ZOLOFT) 100 MG tablet    sertraline (ZOLOFT) 50 MG tablet     No current facility-administered medications for this visit.      ROS  Review Of Systems  Skin: positive for hx keloid scar 2019, dry skin  Eyes: mild vision loss, needs glasses  Ears/Nose/Throat: bilateral hearing loss, complete on R side and partial on L side (described in HPI)  Respiratory: No shortness of breath, dyspnea on exertion, cough, or hemoptysis  Cardiovascular: negative  Gastrointestinal: negative  Genitourinary: amenorrhea secondary to IUD  Musculoskeletal: reports mild chronic back pain  Neurologic: negative  Psychiatric: negative  Hematologic/Lymphatic/Immunologic: negative  Endocrine: negative      /83 (BP Location: Right arm, Patient Position: Sitting, Cuff Size: Adult Large)   Pulse 93   Temp 98.9  F (37.2  C) (Oral)   Resp 16   Ht 1.614 m (5' 3.54\")   Wt 96.1 kg (211 lb 13.8 oz)   HC 59.5 cm (23.43\")   SpO2 98%   BMI 36.89 kg/m      Physical Exam  Constitutional:       Appearance: Normal appearance.   HENT:      Head: Normocephalic.      Nose: Nose normal.   Eyes:      Extraocular Movements: Extraocular movements intact.      Conjunctiva/sclera: Conjunctivae normal.      Pupils: Pupils are equal, round, and reactive to light.   Cardiovascular:      Rate and Rhythm: Normal rate and regular rhythm.      Heart sounds: Normal heart sounds.   Pulmonary:      Effort: Pulmonary effort is normal.      Breath sounds: Normal breath sounds.   Abdominal:      General: Abdomen is flat.   Musculoskeletal:         General: Normal range of motion.   Neurological:      General: No focal deficit present.      Mental Status: She is alert and oriented to person, place, and time. Mental status is at baseline.   Psychiatric:         Mood and Affect: Mood normal.       Impression:  Potential R sided cochlear schwannoma  Familial sensorineural hearing loss     Plan:  Given Nancy's MRI (showing unilateral potential cochlear " schwannoma) and generally negative ROS, there is low suspicion at this time for NF2. More likely some type of familial sensorineural hearing loss. Will send genetic testing. Can proceed with cochlear implant surgery as planned. No follow up needed unless genetic test results are positive for NF2.      This patient was seen and discussed with Dr. Dionicio Jeong.  Susan Rios, MS3  [employer] [facility] [employer]    ----- Services Performed and Documented by Medical Student in Presence of ATTENDING Physician-------    I have reviewed the history and the physical exam. I discussed the plan with the genetic counselor. I agree with the documentation noted above.      Total time = 45 minutes, including preparation, communication, and reviewing imaging.    Trent Jeong MD

## 2024-01-24 LAB — SCANNED LAB RESULT: NORMAL

## 2024-01-29 ENCOUNTER — TELEPHONE (OUTPATIENT)
Dept: CONSULT | Facility: CLINIC | Age: 29
End: 2024-01-29
Payer: MEDICAID

## 2024-01-29 NOTE — TELEPHONE ENCOUNTER
Today, I shared a [employer] message with Nancy notifying her of her recent genetic testing results. To review, I met with Nancy on 01/17 in the company of Dr. Jeong to review possible genetic contributions to her possible right cochlear schwannoma. Of note, a previous genetic test had analyzed NF2 (negative) so only LZTR1 and SMARCB1 were analyzed. This test was also negative, or normal.         Genetics may be re-involved if somatic genetic testing (ie testing on tumor tissue) is recommended after resection.     Lexi Neumann MS, [facility]  Genetic Counselor - [facility]  Phone: [phone]  Email: [email or web address]

## 2024-02-09 ENCOUNTER — THERAPY VISIT (OUTPATIENT)
Dept: PHYSICAL THERAPY | Facility: REHABILITATION | Age: 29
End: 2024-02-09
Attending: OTOLARYNGOLOGY
Payer: MEDICAID

## 2024-02-09 DIAGNOSIS — H90.3 SENSORINEURAL HEARING LOSS (SNHL), BILATERAL: ICD-10-CM

## 2024-02-09 DIAGNOSIS — R42 DIZZINESS: ICD-10-CM

## 2024-02-09 PROCEDURE — 97161 PT EVAL LOW COMPLEX 20 MIN: CPT | Mod: GP | Performed by: PHYSICAL THERAPIST

## 2024-02-09 NOTE — PROGRESS NOTES
"PHYSICAL THERAPY EVALUATION  Type of Visit: Evaluation    Additional subjective: Overall, dizziness is getting better. Currently, only experiencing about 1-2x/month that can last 15-20 minutes.  Can happen when she is reclined/slouched in a chair.  Pt is not sure when she was last dizzy, she thinks maybe last weekend. Pt describes her dizziness as \"spinning\" and \"like getting off a merry-go-round\".     See electronic medical record for Abuse and Falls Screening details.    Subjective       Presenting condition or subjective complaint:    Date of onset: 01/03/24    Relevant medical history:     Dates & types of surgery:      Prior diagnostic imaging/testing results:       Prior therapy history for the same diagnosis, illness or injury:        Prior Level of Function  Transfers: Independent  Ambulation: Independent    Living Environment  Social support:     Type of home:     Stairs to enter the home:         Ramp:     Stairs inside the home:         Help at home:    Equipment owned:       Employment:      Hobbies/Interests:      Patient goals for therapy:      Pain assessment: Pain denied     Objective   Cognitive Status Examination  Orientation: Oriented to person, place and time   Level of Consciousness: Alert    OBSERVATION:   POSTURE: WNL  PALPATION: NA  RANGE OF MOTION: LE ROM WNL  STRENGTH: LE Strength WNL    BED MOBILITY: Independent    TRANSFERS: Independent    GAIT:   Gait Description: WNL    BALANCE:     SPECIAL TESTS  Functional Gait Assessment (FGA)    (<22/30 indicates fall risk)     Dynamic Gait Index (DGI)    (<19/24 indicates fall risk)   Modified CTSIB Conditions (sec) Cond 1: 30  Cond 2: 30  Cond 4: 30  Cond 5 : 30         SENSATION: LE Sensation WNL  COORDINATION: WNL    VESTIBULAR  Cervicogenic Screen    Neck ROM Normal     Oculomotor Screen    Ocular ROM Normal   Smooth Pursuit Normal   Saccades Normal   VOR Normal   VOR Cancellation Normal   Head Impulse Test Normal   Convergence Testing NT    "     Infrared Goggle Exam Vestibular Suppressant in Last 24 Hours? No  Exam Completed With: Infrared goggles   Spontaneous Nystagmus Negative   Gaze Evoked Nystagmus Negative   Head Shake Horizontal Nystagmus Negative   Supine Head-Hanging Test NT    Left Right   Diana-Hallpike Negative Negative   HSCC Supine Roll Test Negative Negative   HSCC Forward Roll Test .NT NT      BPPV Canal(s):  None  BPPV Type:  None    Dynamic Visual Acuity (DVA)    Static Acuity (LogMar)    Horizontal Head Movement at 1 Hz (LogMar)    Horizontal Head Movement at 2 Hz (LogMar)             Assessment & Plan   CLINICAL IMPRESSIONS  Medical Diagnosis: Sensorineural hearing loss (SNHL), bilateral; Dizziness    Treatment Diagnosis: Dizziness   Impression/Assessment: Patient is a 28 year old female with intermittent dizziness complaints that is overall improving.  PT eval is unable to elicit pt's dizziness and all findings are WNL. PT and pt discuss possible triggers for dizziness but at this time, skilled 1:1 PT is not needed.      Clinical Decision Making (Complexity):  Clinical Presentation: Stable/Uncomplicated  Clinical Presentation Rationale: based on medical and personal factors listed in PT evaluation  Clinical Decision Making (Complexity): Low complexity    PLAN OF CARE  Treatment Interventions:  Interventions:  None; Eval only    Long Term Goals None; Eval only         Frequency of Treatment: Eval only  Duration of Treatment: Eval only    Recommended Referrals to Other Professionals:  None  Education Assessment:        Risks and benefits of evaluation/treatment have been explained.   Patient/Family/caregiver agrees with Plan of Care.     Evaluation Time:     PT Eval, Low Complexity Minutes (64393): 30     Signing Clinician: Madelaine Lara, PT

## 2024-02-23 ENCOUNTER — OFFICE VISIT (OUTPATIENT)
Dept: AUDIOLOGY | Facility: CLINIC | Age: 29
End: 2024-02-23
Payer: MEDICAID

## 2024-02-23 DIAGNOSIS — H90.3 SENSORINEURAL HEARING LOSS, BILATERAL: Primary | ICD-10-CM

## 2024-02-23 PROCEDURE — 92626 EVAL AUD FUNCJ 1ST HOUR: CPT | Performed by: AUDIOLOGIST

## 2024-02-23 PROCEDURE — 92567 TYMPANOMETRY: CPT | Performed by: AUDIOLOGIST

## 2024-02-23 NOTE — PROGRESS NOTES
AUDIOLOGY REPORT: Adult Cochlear Implant Evaluation  SUBJECTIVE: Nancy Francisco, 28 year old female was seen in the Audiology Clinic at [facility] on 2/23/24 to perform additional testing to assess audiologic candidacy for a cochlear implant. 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      Nancy reports that she was using hearing aids since age 18. She gave birth to her son in 2020 and she feels her hearing loss began to progress around that time. In 2020, Nancy began having pressure behind her right eye and very loud tinnitus. She then completely lost the hearing in her right ear. 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    Nancy reports that when she tried using the left hearing aid only she still had difficulty hearing in groups and hearing in noisy settings. She has an extensive family history of hearing loss, including her mother, uncle, grandfather, older sister, and daughter. Nancy was previously working as a . 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 Nancy reports that her new job is much lower noise levels.    OBJECTIVE:  To evaluate candidacy for cochlear implant. Candidacy requires at least a moderate to profound sensorineural hearing loss in at least one ear, good general health; lack of benefit from conventional amplification as determined from the tests below, psychological/emotional stability and motivationally suitable, with realistic expectations, and absence of medical conditions contraindicating surgical intervention.     Otoscopy revealed ears are clear of cerumen bilaterally.     Tympanogram:  RIGHT: normal eardrum mobility  LEFT: normal eardrum mobility    Pure tone thresholds were previously assess on 8/16/23 and results revealed profound sensorineural hearing loss (unmeasureable  hearing at all frequencies) and mild sloping to severe rising to moderate sensorineural hearing loss in the left ear.    Speech Reception/Detection Threshold:   RIGHT: No response (speech detection threshold)  LEFT:   60 dB HL (speech reception threshold)    Word Recognition Score:   RIGHT: 0% at 100 dB HL using recorded NU-6 word list.  LEFT:   96% at 90 dB HL using recorded NU-6 word list.    Device(s) used for Aided Testing:   Right ear: Clinic loaner Phonak Rain P90-UP  Left ear: Clinic loaner Phonak Rain P90-UP    Electroacoustic Test Results: Nancy doesn't have any hearing aids currently. Loaner Phonak Rain P90-UP hearing aids were programmed to meet NAL-NL1 prescriptive targets when assessed using simulated real-ear measurements. These hearing aids were used for all testing. The right hearing aid fell below gain targets above 2000 Hz so frequency lowering was enabled. Frequency lowering was enabled above 4000 Hz for the left hearing aid. The right hearing aid volume had to be decreased by 3 clicks on the volume control to reduce feedback during testing. Previous testing on 8/16/23 was done with the same type of loaner hearing aids which were set to NAL-NL2 gain targets.    40 minutes were spent assessing the patient's auditory rehabilitation status in order to determine whether conventional amplification is beneficial or whether the patient is an audiologic candidate for a cochlear implant.      Soundfield Thresholds (see audiogram):   Left aided: Detection in the mild sloping to moderate rising to mild hearing loss range  Right aided: Detection in the un-measureable hearing loss range (tested on 8/16/23)    CNC Words Test: The patient repeats 50 single syllable words, auditory only. The words are presented at 60 dB A (conversational level) through a recording.     Left ear aided: 80% words  Right ear aided: 0% words (tested on 8/16/23)  Bilaterally aided: 40% words    AzBio Sentences Test: The patient  repeats 20 sentences, auditory only.  The sentences are presented at 60 dB A (conversational level) delivered through a recording.       Left ear aided: 91% in quiet, 63% with +10 dB signal to noise ratio (SNR), 49% with +5 dB SNR  Right ear aided: 0% in quiet (tested on 8/16/23)  Bilaterally aided: 75% in quiet, 68% with +10 dB SNR, 1% with +5 dB SNR    ASSESSMENT: Aided soundfield thresholds were repeated for the left ear and aided soundfield testing was repeated for the left aided and bilaterally aided conditions.     Testing today shows a 5-30 dB improvement in aided thresholds at all frequencies in the left ear compared to results from 8/16/23.    There was a significant improvement for several of the aided speech perception test conditions in both the left aided and bilaterally aided conditions compared to testing from 8/16/23.    PLAN: Nancy has been diagnosed with mild sloping to severe rising to moderate sensorineural hearing loss in the left ear and a dead ear in the right ear. The results from today will be discussed with the cochlear implant team to determine her candidacy for a cochlear implant in the right ear. Please contact the clinic at [phone] with questions regarding these results or recommendations.    Juan Pablo Roblero, [facility]  [place] Licensed Audiologist #[id number]

## 2024-03-07 ENCOUNTER — MYC MEDICAL ADVICE (OUTPATIENT)
Dept: AUDIOLOGY | Facility: CLINIC | Age: 29
End: 2024-03-07
Payer: MEDICAID

## 2024-03-08 ENCOUNTER — PRE VISIT (OUTPATIENT)
Dept: SURGERY | Facility: CLINIC | Age: 29
End: 2024-03-08

## 2024-03-08 NOTE — TELEPHONE ENCOUNTER
Canceled patients surgery & post ops per Brooklyn Baker, Perioperative Coordinator 3/8/2024 at 3:33 PM

## 2024-03-08 NOTE — TELEPHONE ENCOUNTER
Spoke with Nancy. Relayed info that Dr. Holley does not want to move forward with cochlear implant surgery and this time and needs to connect with Nancy via phone to explain reasoning & future plan. Explained that when surgery was scheduled, other appts/testing and discussion with ENT were needed and unfortunately the surgery was scheduled sooner than we had that information, but the plan has since changed. Expressed apology for any inconvenience, confirmed scheduled PAC appt is cancelled and that I will message Dr. Holley with Nancy's contact information/best times for call. Reiterated that considerations are in her best interest, she was understanding of info and looking forward to more details.    Huong NO   CI Coordinator  3/8/24  12:10pm

## 2024-03-11 ENCOUNTER — TELEPHONE (OUTPATIENT)
Dept: OTOLARYNGOLOGY | Facility: CLINIC | Age: 29
End: 2024-03-11
Payer: MEDICAID

## 2024-03-11 DIAGNOSIS — H90.3 SENSORINEURAL HEARING LOSS (SNHL), BILATERAL: Primary | ICD-10-CM

## 2024-03-12 ENCOUNTER — MYC MEDICAL ADVICE (OUTPATIENT)
Dept: AUDIOLOGY | Facility: CLINIC | Age: 29
End: 2024-03-12

## 2024-03-14 ENCOUNTER — TELEPHONE (OUTPATIENT)
Dept: OTOLARYNGOLOGY | Facility: CLINIC | Age: 29
End: 2024-03-14
Payer: MEDICAID

## 2024-03-20 ENCOUNTER — TELEPHONE (OUTPATIENT)
Dept: AUDIOLOGY | Facility: CLINIC | Age: 29
End: 2024-03-20
Payer: MEDICAID

## 2024-03-20 ENCOUNTER — DOCUMENTATION ONLY (OUTPATIENT)
Dept: OTOLARYNGOLOGY | Facility: CLINIC | Age: 29
End: 2024-03-20
Payer: MEDICAID

## 2024-03-20 PROCEDURE — 99207 PR NO BILLABLE SERVICE THIS VISIT: CPT | Performed by: OTOLARYNGOLOGY

## 2024-03-20 NOTE — PROGRESS NOTES
03/20/24    Nancy Francisco is medically cleared for a hearing aid in her left ear.    Lazara Holley MD  Otology & Neurotology  [facility]

## 2024-03-20 NOTE — TELEPHONE ENCOUNTER
December 16, 2022  Re: Raya Olmedo  1991      Thank you so much for referring Raya Olmedo to the [facility]. I had the pleasure of visiting with Raya today.     Attached you will find a copy of my note. Please feel free to reach out to me with any questions, [phone].     Facial Plastic and Reconstructive Surgery      Raya Olmedo presents today for follow-up.  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  She has asymmetry when she smiles also when she speaks and his mid smile.  At full extreme of smell there is increased symmetry but she does have decreased dental show on the left.  She is interested in discussing other possible options for this.    We reviewed her images today.  She has concerns that she feels there were significant changes made in 2018 that she feels have changed some of the specifics of her nose and also the introduction of the graft on the left has led to this lip concern.  She does have and interested in seeing if she could benefit from any intervention on the lips.    We discussed that a couple millimeters excision of the lip tissue on the left at the site of the cleft lip repair would be beneficial.  We discussed that this could be performed in clinic.  She would be interested in proceeding with this.    I spent a total of 45 minutes in the care of patient Raya Olmedo during today's office visit. This time includes reviewing the patient's chart and prior history, obtaining a history, performing an examination and evaluation and counseling the patient. Time spent in documentation and care coordination is included.       Sincerely,  Tereza Garces MD   I called Nancy and left her a voicemail. I told her that she should be able to contact her insurance to determine which clinics she can go to in [place] in order to be fit with a new hearing aid. Otherwise she can try calling local clinics to ask if they accept her insurance. I requested medical clearance for a left hearing aid from Dr. Holley. I will send the medical clearance and audiogram to Nancy so she can proceed with a hearing aid consultation in [place].    Haley Roblero., [facility]  [place] Licensed Audiologist #[id number]

## 2024-04-26 NOTE — TELEPHONE ENCOUNTER
Nancy called to schedule recommended 1 year follow-up with Dr. Holley/ENT, updated hearing test, and MRI IAC per Dr. Holley. Pt chose day, writer confirmed info would show up in [employer] once fully scheduled. No further questions.    Huong NO 4/26/24

## 2024-08-06 ENCOUNTER — TELEPHONE (OUTPATIENT)
Dept: OTOLARYNGOLOGY | Facility: CLINIC | Age: 29
End: 2024-08-06
Payer: MEDICAID

## 2024-08-06 NOTE — TELEPHONE ENCOUNTER
Nancy called saying that she's started a new job and was wondering if she could reschedule Nov appt dates to a day she has off from work. Writer was able to find same-day visit for all 3 appts (MRI/audio/return with Dr. Holley). Mailed visit reminder will be sent per request. Pt confirmed insurance remains current.    (Note; Pt has Tue/Wed/Thur off from new job).     Huong NO 8/6/24

## 2024-10-28 ENCOUNTER — OFFICE VISIT (OUTPATIENT)
Dept: AUDIOLOGY | Facility: CLINIC | Age: 29
End: 2024-10-28
Payer: MEDICAID

## 2024-10-28 DIAGNOSIS — H90.3 ASYMMETRICAL SENSORINEURAL HEARING LOSS: Primary | ICD-10-CM

## 2024-10-28 PROCEDURE — 92557 COMPREHENSIVE HEARING TEST: CPT | Mod: 52 | Performed by: AUDIOLOGIST-HEARING AID FITTER

## 2024-10-28 PROCEDURE — 92550 TYMPANOMETRY & REFLEX THRESH: CPT | Performed by: AUDIOLOGIST-HEARING AID FITTER

## 2024-10-28 NOTE — PROGRESS NOTES
AUDIOLOGY REPORT    SUMMARY: Audiology visit completed. See audiogram for results.      RECOMMENDATIONS: Follow-up with ENT.    Juan Pablo Rosas, CCC-A, [facility]  Licensed Audiologist  [place] #[id number]

## 2024-11-05 NOTE — PROGRESS NOTES
Neurotology Clinic      Name: Nancy Francisco  MRN: [id number]  Age: 29 year old  : [birth year]24     Chief Complaint:   Follow up cochlear schwannoma, bilateral sensorineural hearing loss     History of Present Illness:   Nancy Francisco is a 29 year old female with a history of sensorineural hearing loss (bilateral) who presents for follow up regarding headaches. She was initially seen on 10/13/2023, where she reported that she had hearing loss in both ears since she was a teenager. She began wearing binaural hearing aids at age 18.  She gave birth to her son in  and felt that her hearing loss began to progress around that time.  In , she began having pressure behind her right eye and very loud tinnitus. She then completely lost the hearing in her right ear suddenly. She reports having imaging of her head after this incident. She reports she had a BiCROS hearing aid system at [facility] in [place], but stopped wearing it as she felt like she could not hear clearly with it. She was cleared for cochlear implantation, with the need for a gadolinium enhanced MRI with high-resolution views of the cochlea as well to assess for any cochlear obstruction or the possibility of a schwannoma.    She had prior genetic testing for NF2-SWN which was negative.  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  She does have a family history of genetic hearing loss.  She notes that her left ear hearing pattern is more consistent with what her family is experienced.    She presents today for follow-up and discussion of cochlear implant on the right side.  She was supposed to have an MRI to follow-up cochlear schwannoma but it was not completed since it was denied by insurance stating there was no indication for imaging..  Since her last visit she notes no significant changes on the right side.  She has intermittent ringing on that side and occasional dizziness when standing but no  "imbalance issues.  Denies facial numbness tingling or weakness.  On her left ear she notes about 3 months ago that she started having a decrease in the hearing such as people talking through a pillow.  The hearing aids that she was using both unilateral and by cross did not give her much benefit and just made her confused.  She tried adjusting them but ultimately gave up and gave them away.  About 3 weeks ago she noted increased and static on the left ear suddenly and this has been stable since.  She denies fullness, increased ringing or other ear symptoms.  She now uses no hearing aids and gets by mostly by lipreading using text and signing when possible.  This makes it very difficult to communicate with her children and puts her at risk of losing her job.  She is still very interested in cochlear implant on the right side but understands that we need to have a follow-up MRI prior to proceeding.    She previously had a keloid removed from her earlobe in .  She notes this occurred in her right earlobe after she experienced trauma to her gauge causing a tear which was healed by secondary intention.  She had it resected and injected with steroids with no recurrence afterwards.  She has no other keloids.  She had a  in  and in .        Review of Systems:   Pertinent items are noted in HPI or as in patient entered ROS below, remainder of complete ROS is negative.       11/10/2024     3:20 PM    ENT ROS   Neurology Headache   Ears, Nose, Throat Hearing loss    Ringing/noise in ears         Physical Exam:   /74   Temp 98.3  F (36.8  C)   Ht 1.6 m (5' 3\")   Wt 79.8 kg (176 lb)   SpO2 98%   BMI 31.18 kg/m       Constitutional:  The patient was unaccompanied, well-groomed, and in no acute distress.  She relied significantly on lipreading and frequently turned her head to the right to favor her left ear.   Skin: Normal:  warm and pink without rash   Neurologic: Alert and oriented x 3.  CN's " III-XII within normal limits.  Voice normal.    Psychiatric: The patient's affect was calm, cooperative, and appropriate.     Communication:  Normal; communicates verbally, normal voice quality.   Respiratory: Breathing comfortably without stridor or exertion of accessory muscles.    Head/Face:  No lesions or scars. No sinus tenderness.     Eyes: Pupils were equal and reactive.  Extraocular movement intact.     Ears: Pinnae and tragus non-tender.        Otologic microscope exam:  Right ear: External auditory canal clear, tympanic membrane intact with no effusion or retraction  Left ear: External auditory canal clear, tympanic membrane intact with no effusion or retraction     Audiogram:  AUDIOGRAM: She underwent an audiogram today 11/12/2024. This demonstrated moderate sloping to severe rising to moderate SNHL in the left ear. DNT right due to documented complete loss. RE 10/ 28/ 24- thresholds stable ( all within 5 dB) . WRS completed at various levels similar to previous testing. WRS worsened slightly from 96% to 88% (not clinically significant). Tymps: WNL bilaterally. Reflexes: left ipsi present WNL, all other conditions absent.      Right: Speech reception threshold is DNT dB with DNT% word recognition. Tympanogram A type   Left: Speech reception threshold is 50 dB with 88%  word recognition. Tympanogram A type     AUDIOGRAM 10/28/2024  Results: Left moderate to moderately severe rising to moderate sensorineural hearing loss - re: 8/ 16/ 23 10 dB decrease at 250, 500 and 1500 Hz. Word rec at 85 dB is stable ( with lower scores at higher presentation levels) . Did not test right ear since no measurable hearing on 8/ 16/ 23 Tympanograms normal in both ears. Acoustic reflexes: Right ipsi and right contra absent, Left ipsi and left contra elevated.    Right: Speech reception threshold is DNT dB with DNT% word recognition. Tympanogram A type   Left: Speech reception threshold is 50 dB with 84% / 80% / 96%  word  recognition. Tympanogram A type     Audiogram was independently reviewed    Imaging:  MR BRAIN W/O and W CONTRAST 11/3/2023  INDICATION:  Sensorineural hearing loss (SNHL) of both ears  COMPARISON: None.     FINDINGS:     IAC: Right middle and basal turn of the cochlea demonstrates subtle decreased T2, subtle increased T1, superimposed pronounced enhancement. Findings may reflect fibrosis, infection/inflammation process, or neoplasm (i.e. typically schwannoma).      Remaining inner ear structures bilaterally demonstrate fluid signal, no increased T1, no enhancement.     Normal cranial nerve VII and VIII complexes bilaterally. No cerebellopontine angle or internal auditory canal mass. No pathologic cranial nerve or temporal bone contrast enhancement.  No significant mastoid effusion.     INTRACRANIAL CONTENTS: No acute or subacute infarct. No mass, acute hemorrhage, or extra-axial fluid collections. Normal brain parenchymal signal. Normal ventricles and sulci. Normal position of the cerebellar tonsils. No pathologic contrast enhancement.     OTHER: Multiple small scalp nodules or cysts. Visualized paranasal sinuses are essentially clear.                                                                     IMPRESSION:  Right middle and basal turn of the cochlea demonstrates subtle abnormal signal and pronounced enhancement. Findings may reflect fibrosis, infection/inflammation process, or neoplasm (i.e. typically schwannoma).   Remaining inner ear structures bilaterally appear within normal limits.  Unremarkable MRI of the brain.    CT TEMPORAL W/O CONTRAST 10/13/2023   Provided History: SNHL (sensorineural hearing loss)     Comparison: MRI 2/19/2021     Findings:   Right temporal bone: The mastoid air cells are clear. There is no debris in the external auditory canal. The tympanic membrane is intact. There is no fluid or soft tissue thickening in the right middle ear cavity. The bony ossicles are intact and in normal  alignment. The epitympanum is clear. The bony scutum is sharp. The internal auditory canal and the labyrinthine structures are within normal limits. The facial nerve canal appears normal along its course.     Left temporal bone: The mastoid air cells are clear. There is no debris in the external auditory canal. The tympanic membrane is intact. There is no fluid or soft tissue thickening in the left middle ear cavity. The bony ossicles are intact and in normal alignment. The epitympanum is clear. The bony scutum is sharp. The internal auditory canal and the labyrinthine structures are within normal limits. The facial nerve canal appears normal along its course.                                                                     Impression:  Normal CT study of the temporal bones.    Outside records from [facility] were independently reviewed.       Assessment and Plan:  Nancy Francisco is a 29 year old female with a history of sensorineural hearing loss (bilateral) who presents for follow up.  She has a likely right-sided cochlear schwannoma with profound sensorineural hearing loss.  In addition she has a left-sided sensory neural hearing loss that is progressively worsening over time.  She is no longer using her hearing aids as that is no longer helping and is reliant on sign language, lipreading, writing, and speech directly into the left ear.  This is having significant effect on her life including interaction with kids and work experience.  She was supposed to have a follow-up MRI today to assess the size and location of the suspected cochlear tumor for treatment planning and to help plan cochlear implant surgery however this was not completed.  She is very interested in continuing the process towards removal of the cochlear schwannoma and implantation of cochlear implant as she feels that any recovery of hearing will be significantly beneficial to her life.  This is especially true as her  left-sided hearing continues to worsen.  We will help her coordinate the follow-up MRI and call her with the results and for further discussion of surgical planning.    This may require surgery of a canal wall down mastoidectomy with removal of cochlear schwannoma and concurrent cochlear implantation and this was discussed including risks benefits and alternatives.  This includes risk to the facial nerve and facial paresis, chorda tympani and associated taste alteration, damage to the vestibular system and subsequent vertigo and dizziness that could last months, meningitis and need for removal and reimplantation and a variety of hearing outcomes.  She repeated these risks and the relative steps of surgery back to us with good understanding and is interested in pursuing surgery.  We will work on getting the imaging and moving forward.    Erich Sandoval MD   Otolaryngology - Head and Neck Surgery, PGY 2  Please page ENT with questions    I, Lazara Holley MD, saw this patient with the resident/fellow and agree with the resident's findings and plan of care as documented in the resident's/fellow's note.      Electronically signed by:    Lazara Holley MD  Otology & Neurotology  [facility]

## 2024-11-11 ENCOUNTER — TELEPHONE (OUTPATIENT)
Dept: OTOLARYNGOLOGY | Facility: CLINIC | Age: 29
End: 2024-11-11
Payer: COMMERCIAL

## 2024-11-11 NOTE — TELEPHONE ENCOUNTER
LVM informing pt we will cancel the MRI booked for tomorrow AM but keep the consult and audiogram. Writer explained she will speak with Dr. Holley right away in the morning and determine if we should reschedule everything to a time after a peer to peer takes place for the MRI.

## 2024-11-11 NOTE — TELEPHONE ENCOUNTER
[facility] Call Center    Phone Message    May a detailed message be left on voicemail: yes     Reason for Call: Other: PT calling in regards to her MRI tomorrow stating her insurance just called and denied the prior authorization due to it not being medically necessary due to lack of information.. Please advise. Thanks.      Action Taken: Other: ENT    Travel Screening: Not Applicable     Date of Service:

## 2024-11-12 ENCOUNTER — OFFICE VISIT (OUTPATIENT)
Dept: OTOLARYNGOLOGY | Facility: CLINIC | Age: 29
End: 2024-11-12
Attending: OTOLARYNGOLOGY
Payer: COMMERCIAL

## 2024-11-12 ENCOUNTER — OFFICE VISIT (OUTPATIENT)
Dept: AUDIOLOGY | Facility: CLINIC | Age: 29
End: 2024-11-12
Attending: OTOLARYNGOLOGY
Payer: COMMERCIAL

## 2024-11-12 ENCOUNTER — PREP FOR PROCEDURE (OUTPATIENT)
Dept: OTOLARYNGOLOGY | Facility: CLINIC | Age: 29
End: 2024-11-12

## 2024-11-12 VITALS
TEMPERATURE: 98.3 F | BODY MASS INDEX: 31.18 KG/M2 | WEIGHT: 176 LBS | SYSTOLIC BLOOD PRESSURE: 117 MMHG | OXYGEN SATURATION: 98 % | DIASTOLIC BLOOD PRESSURE: 74 MMHG | HEIGHT: 63 IN

## 2024-11-12 DIAGNOSIS — D33.3 VESTIBULAR SCHWANNOMA (H): Primary | ICD-10-CM

## 2024-11-12 DIAGNOSIS — H90.3 ASYMMETRICAL SENSORINEURAL HEARING LOSS: ICD-10-CM

## 2024-11-12 DIAGNOSIS — H90.3 SENSORINEURAL HEARING LOSS (SNHL), BILATERAL: ICD-10-CM

## 2024-11-12 DIAGNOSIS — H90.3 ASYMMETRICAL SENSORINEURAL HEARING LOSS: Primary | ICD-10-CM

## 2024-11-12 PROCEDURE — 99214 OFFICE O/P EST MOD 30 MIN: CPT | Mod: GC | Performed by: OTOLARYNGOLOGY

## 2024-11-12 ASSESSMENT — PAIN SCALES - GENERAL: PAINLEVEL_OUTOF10: NO PAIN (0)

## 2024-11-12 NOTE — NURSING NOTE
"Chief Complaint   Patient presents with    RECHECK     Follow up     Blood pressure 117/74, temperature 98.3  F (36.8  C), height 1.6 m (5' 3\"), weight 79.8 kg (176 lb), SpO2 98%.  Bonifacio Vasquez LPN    "

## 2024-11-12 NOTE — LETTER
2024       RE: Nancy Francisco  [address]  [facility] [address]     Dear Colleague,    Thank you for referring your patient, Nancy Francisco, to the [facility] [place] [facility]. Please see a copy of my visit note below.      Neurotology Clinic      Name: Nancy Francisco  MRN: [id number]  Age: 29 year old  : 2024     Chief Complaint:   Follow up cochlear schwannoma, bilateral sensorineural hearing loss     History of Present Illness:   Nancy Francisco is a 29 year old female with a history of sensorineural hearing loss (bilateral) who presents for follow up regarding headaches. She was initially seen on 10/13/2023, where she reported that she had hearing loss in both ears since she was a teenager. She began wearing binaural hearing aids at age 18.  She gave birth to her son in  and felt that her hearing loss began to progress around that time.  In , she began having pressure behind her right eye and very loud tinnitus. She then completely lost the hearing in her right ear suddenly. She reports having imaging of her head after this incident. She reports she had a BiCROS hearing aid system at [facility] in [place], but stopped wearing it as she felt like she could not hear clearly with it. She was cleared for cochlear implantation, with the need for a gadolinium enhanced MRI with high-resolution views of the cochlea as well to assess for any cochlear obstruction or the possibility of a schwannoma.    She had prior genetic testing for NF2-SWN which was negative.  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  She does have a family history of genetic hearing loss.  She notes that her left ear hearing pattern is more consistent with what her family is experienced.    She presents today for follow-up and discussion of cochlear implant on the right side.  She was supposed to  "have an MRI to follow-up cochlear schwannoma but it was not completed since it was denied by insurance stating there was no indication for imaging..  Since her last visit she notes no significant changes on the right side.  She has intermittent ringing on that side and occasional dizziness when standing but no imbalance issues.  Denies facial numbness tingling or weakness.  On her left ear she notes about 3 months ago that she started having a decrease in the hearing such as people talking through a pillow.  The hearing aids that she was using both unilateral and by cross did not give her much benefit and just made her confused.  She tried adjusting them but ultimately gave up and gave them away.  About 3 weeks ago she noted increased and static on the left ear suddenly and this has been stable since.  She denies fullness, increased ringing or other ear symptoms.  She now uses no hearing aids and gets by mostly by lipreading using text and signing when possible.  This makes it very difficult to communicate with her children and puts her at risk of losing her job.  She is still very interested in cochlear implant on the right side but understands that we need to have a follow-up MRI prior to proceeding.    She previously had a keloid removed from her earlobe in .  She notes this occurred in her right earlobe after she experienced trauma to her gauge causing a tear which was healed by secondary intention.  She had it resected and injected with steroids with no recurrence afterwards.  She has no other keloids.  She had a  in  and in .        Review of Systems:   Pertinent items are noted in HPI or as in patient entered ROS below, remainder of complete ROS is negative.       11/10/2024     3:20 PM    ENT ROS   Neurology Headache   Ears, Nose, Throat Hearing loss    Ringing/noise in ears         Physical Exam:   /74   Temp 98.3  F (36.8  C)   Ht 1.6 m (5' 3\")   Wt 79.8 kg (176 lb)   SpO2 " 98%   BMI 31.18 kg/m       Constitutional:  The patient was unaccompanied, well-groomed, and in no acute distress.  She relied significantly on lipreading and frequently turned her head to the right to favor her left ear.   Skin: Normal:  warm and pink without rash   Neurologic: Alert and oriented x 3.  CN's III-XII within normal limits.  Voice normal.    Psychiatric: The patient's affect was calm, cooperative, and appropriate.     Communication:  Normal; communicates verbally, normal voice quality.   Respiratory: Breathing comfortably without stridor or exertion of accessory muscles.    Head/Face:  No lesions or scars. No sinus tenderness.     Eyes: Pupils were equal and reactive.  Extraocular movement intact.     Ears: Pinnae and tragus non-tender.        Otologic microscope exam:  Right ear: External auditory canal clear, tympanic membrane intact with no effusion or retraction  Left ear: External auditory canal clear, tympanic membrane intact with no effusion or retraction     Audiogram:  AUDIOGRAM: She underwent an audiogram today 11/12/2024. This demonstrated moderate sloping to severe rising to moderate SNHL in the left ear. DNT right due to documented complete loss. RE 10/ 28/ 24- thresholds stable ( all within 5 dB) . WRS completed at various levels similar to previous testing. WRS worsened slightly from 96% to 88% (not clinically significant). Tymps: WNL bilaterally. Reflexes: left ipsi present WNL, all other conditions absent.      Right: Speech reception threshold is DNT dB with DNT% word recognition. Tympanogram A type   Left: Speech reception threshold is 50 dB with 88%  word recognition. Tympanogram A type     AUDIOGRAM 10/28/2024  Results: Left moderate to moderately severe rising to moderate sensorineural hearing loss - re: 8/ 16/ 23 10 dB decrease at 250, 500 and 1500 Hz. Word rec at 85 dB is stable ( with lower scores at higher presentation levels) . Did not test right ear since no measurable  hearing on 8/ 16/ 23 Tympanograms normal in both ears. Acoustic reflexes: Right ipsi and right contra absent, Left ipsi and left contra elevated.    Right: Speech reception threshold is DNT dB with DNT% word recognition. Tympanogram A type   Left: Speech reception threshold is 50 dB with 84% / 80% / 96%  word recognition. Tympanogram A type     Audiogram was independently reviewed    Imaging:  MR BRAIN W/O and W CONTRAST 11/3/2023  INDICATION:  Sensorineural hearing loss (SNHL) of both ears  COMPARISON: None.     FINDINGS:     IAC: Right middle and basal turn of the cochlea demonstrates subtle decreased T2, subtle increased T1, superimposed pronounced enhancement. Findings may reflect fibrosis, infection/inflammation process, or neoplasm (i.e. typically schwannoma).      Remaining inner ear structures bilaterally demonstrate fluid signal, no increased T1, no enhancement.     Normal cranial nerve VII and VIII complexes bilaterally. No cerebellopontine angle or internal auditory canal mass. No pathologic cranial nerve or temporal bone contrast enhancement.  No significant mastoid effusion.     INTRACRANIAL CONTENTS: No acute or subacute infarct. No mass, acute hemorrhage, or extra-axial fluid collections. Normal brain parenchymal signal. Normal ventricles and sulci. Normal position of the cerebellar tonsils. No pathologic contrast enhancement.     OTHER: Multiple small scalp nodules or cysts. Visualized paranasal sinuses are essentially clear.                                                                     IMPRESSION:  Right middle and basal turn of the cochlea demonstrates subtle abnormal signal and pronounced enhancement. Findings may reflect fibrosis, infection/inflammation process, or neoplasm (i.e. typically schwannoma).   Remaining inner ear structures bilaterally appear within normal limits.  Unremarkable MRI of the brain.    CT TEMPORAL W/O CONTRAST 10/13/2023   Provided History: SNHL (sensorineural  hearing loss)     Comparison: MRI 2/19/2021     Findings:   Right temporal bone: The mastoid air cells are clear. There is no debris in the external auditory canal. The tympanic membrane is intact. There is no fluid or soft tissue thickening in the right middle ear cavity. The bony ossicles are intact and in normal alignment. The epitympanum is clear. The bony scutum is sharp. The internal auditory canal and the labyrinthine structures are within normal limits. The facial nerve canal appears normal along its course.     Left temporal bone: The mastoid air cells are clear. There is no debris in the external auditory canal. The tympanic membrane is intact. There is no fluid or soft tissue thickening in the left middle ear cavity. The bony ossicles are intact and in normal alignment. The epitympanum is clear. The bony scutum is sharp. The internal auditory canal and the labyrinthine structures are within normal limits. The facial nerve canal appears normal along its course.                                                                     Impression:  Normal CT study of the temporal bones.    Outside records from [facility] were independently reviewed.       Assessment and Plan:  Nancy Francisco is a 29 year old female with a history of sensorineural hearing loss (bilateral) who presents for follow up.  She has a likely right-sided cochlear schwannoma with profound sensorineural hearing loss.  In addition she has a left-sided sensory neural hearing loss that is progressively worsening over time.  She is no longer using her hearing aids as that is no longer helping and is reliant on sign language, lipreading, writing, and speech directly into the left ear.  This is having significant effect on her life including interaction with kids and work experience.  She was supposed to have a follow-up MRI today to assess the size and location of the suspected cochlear tumor for treatment planning and to help  plan cochlear implant surgery however this was not completed.  She is very interested in continuing the process towards removal of the cochlear schwannoma and implantation of cochlear implant as she feels that any recovery of hearing will be significantly beneficial to her life.  This is especially true as her left-sided hearing continues to worsen.  We will help her coordinate the follow-up MRI and call her with the results and for further discussion of surgical planning.    This may require surgery of a canal wall down mastoidectomy with removal of cochlear schwannoma and concurrent cochlear implantation and this was discussed including risks benefits and alternatives.  This includes risk to the facial nerve and facial paresis, chorda tympani and associated taste alteration, damage to the vestibular system and subsequent vertigo and dizziness that could last months, meningitis and need for removal and reimplantation and a variety of hearing outcomes.  She repeated these risks and the relative steps of surgery back to us with good understanding and is interested in pursuing surgery.  We will work on getting the imaging and moving forward.    Erich Sandoval MD   Otolaryngology - Head and Neck Surgery, PGY 2  Please page ENT with questions    I, Lazara Holley MD, saw this patient with the resident/fellow and agree with the resident's findings and plan of care as documented in the resident's/fellow's note.      Electronically signed by:    Lazara Holley MD  Otology & Neurotology  [facility]        Again, thank you for allowing me to participate in the care of your patient.      Sincerely,    Lazara Holley MD

## 2024-11-12 NOTE — PATIENT INSTRUCTIONS
You were seen in the [facility]NT [facility] today by Dr. Holley. If you have any questions or concerns after your appointment, please contact us (see below)      2.   We will work to figure out the MRI and reach out to schedule shortly.     3. Surgical orders will be placed to start the process.         How to Contact Us:  Send a [employer] message to your provider. Our team will respond to you via [employer]. Occasionally, we will need to call you to get further information.  For urgent matters (Monday-Friday), call the ENT Clinic: [phone] and speak with a call center team member - they will route your call appropriately.   If you'd like to speak directly with a nurse, please find our contact information below. We do our best to check voicemail frequently throughout the day, and will work to call you back within 1-2 days. For urgent matters, please use the general clinic phone numbers listed above.      Muriel STARR RN  ENT RN Care Coordinator  Direct: [phone]    Nivia ESPINOZA LPN  Direct: [phone]

## 2024-11-12 NOTE — PROGRESS NOTES
AUDIOLOGY REPORT     SUMMARY: Audiology visit completed. See audiogram for results.       RECOMMENDATIONS: Follow-up with ENT.     Juan Pablo Escobar CCC-A  Licensed Audiologist   [place] #[id number]

## 2024-11-18 ENCOUNTER — TELEPHONE (OUTPATIENT)
Dept: OTOLARYNGOLOGY | Facility: CLINIC | Age: 29
End: 2024-11-18
Payer: COMMERCIAL

## 2024-11-18 NOTE — TELEPHONE ENCOUNTER
Health Call Center    Phone Message    May a detailed message be left on voicemail: yes     Reason for Call: Other: Pt is requesting an order for an MRI be placed for the [facility] in [facility]. Please call with any questions. Did confirm phone number. Thanks      Action Taken: Message routed to:  [facility] ENT    Travel Screening: Not Applicable     Date of Service:

## 2024-12-05 ENCOUNTER — PRE VISIT (OUTPATIENT)
Dept: OTOLARYNGOLOGY | Facility: CLINIC | Age: 29
End: 2024-12-05
Payer: COMMERCIAL

## 2024-12-05 NOTE — TELEPHONE ENCOUNTER
Records Requested 12/05/2024 at 1:03 PM  [id number]   Comment [facility]    12/3/24 MR brain report in Care Everywhere. Image requested.

## 2024-12-17 DIAGNOSIS — D33.3 VESTIBULAR SCHWANNOMA (H): ICD-10-CM

## 2024-12-17 DIAGNOSIS — H90.3 SENSORINEURAL HEARING LOSS (SNHL), BILATERAL: Primary | ICD-10-CM

## 2025-06-29 ENCOUNTER — HEALTH MAINTENANCE LETTER (OUTPATIENT)
Age: 30
End: 2025-06-29